# Patient Record
Sex: FEMALE | Race: WHITE | NOT HISPANIC OR LATINO | Employment: OTHER | URBAN - METROPOLITAN AREA
[De-identification: names, ages, dates, MRNs, and addresses within clinical notes are randomized per-mention and may not be internally consistent; named-entity substitution may affect disease eponyms.]

---

## 2023-03-20 DIAGNOSIS — F41.9 ANXIETY: ICD-10-CM

## 2023-03-20 RX ORDER — ESCITALOPRAM OXALATE 10 MG/1
1 TABLET ORAL DAILY
COMMUNITY
Start: 2021-12-06 | End: 2023-03-20 | Stop reason: SDUPTHER

## 2023-03-20 RX ORDER — ESCITALOPRAM OXALATE 10 MG/1
10 TABLET ORAL DAILY
Qty: 90 TABLET | Refills: 0 | Status: SHIPPED | OUTPATIENT
Start: 2023-03-20 | End: 2023-06-13 | Stop reason: SDUPTHER

## 2023-04-07 ENCOUNTER — TELEPHONE (OUTPATIENT)
Dept: PRIMARY CARE | Facility: CLINIC | Age: 83
End: 2023-04-07
Payer: MEDICARE

## 2023-04-21 ENCOUNTER — TELEPHONE (OUTPATIENT)
Dept: PRIMARY CARE | Facility: CLINIC | Age: 83
End: 2023-04-21
Payer: MEDICARE

## 2023-06-13 ENCOUNTER — OFFICE VISIT (OUTPATIENT)
Dept: PRIMARY CARE | Facility: CLINIC | Age: 83
End: 2023-06-13
Payer: MEDICARE

## 2023-06-13 VITALS
BODY MASS INDEX: 38.34 KG/M2 | SYSTOLIC BLOOD PRESSURE: 146 MMHG | OXYGEN SATURATION: 94 % | DIASTOLIC BLOOD PRESSURE: 76 MMHG | HEART RATE: 64 BPM | WEIGHT: 171 LBS

## 2023-06-13 DIAGNOSIS — Z00.00 MEDICARE ANNUAL WELLNESS VISIT, SUBSEQUENT: Primary | ICD-10-CM

## 2023-06-13 DIAGNOSIS — E78.2 MIXED HYPERLIPIDEMIA: ICD-10-CM

## 2023-06-13 DIAGNOSIS — Z12.31 ENCOUNTER FOR SCREENING MAMMOGRAM FOR BREAST CANCER: ICD-10-CM

## 2023-06-13 DIAGNOSIS — I10 BENIGN ESSENTIAL HYPERTENSION: ICD-10-CM

## 2023-06-13 DIAGNOSIS — Z13.89 ENCOUNTER FOR SCREENING FOR OTHER DISORDER: ICD-10-CM

## 2023-06-13 DIAGNOSIS — E11.9 TYPE 2 DIABETES MELLITUS WITHOUT COMPLICATION, WITHOUT LONG-TERM CURRENT USE OF INSULIN (MULTI): ICD-10-CM

## 2023-06-13 DIAGNOSIS — Z00.00 ROUTINE GENERAL MEDICAL EXAMINATION AT A HEALTH CARE FACILITY: ICD-10-CM

## 2023-06-13 DIAGNOSIS — F41.9 ANXIETY: ICD-10-CM

## 2023-06-13 DIAGNOSIS — E66.01 CLASS 2 SEVERE OBESITY DUE TO EXCESS CALORIES WITH SERIOUS COMORBIDITY AND BODY MASS INDEX (BMI) OF 38.0 TO 38.9 IN ADULT (MULTI): ICD-10-CM

## 2023-06-13 PROBLEM — R26.2 DIFFICULTY WALKING: Status: ACTIVE | Noted: 2023-06-13

## 2023-06-13 PROBLEM — G47.00 INSOMNIA: Status: ACTIVE | Noted: 2023-06-13

## 2023-06-13 PROBLEM — R26.89 BALANCE DISORDER: Status: ACTIVE | Noted: 2023-06-13

## 2023-06-13 PROBLEM — M85.80 OSTEOPENIA: Status: ACTIVE | Noted: 2023-06-13

## 2023-06-13 PROBLEM — M51.369 DEGENERATION OF INTERVERTEBRAL DISC OF LUMBAR REGION: Status: ACTIVE | Noted: 2023-06-13

## 2023-06-13 PROBLEM — M51.36 DEGENERATION OF INTERVERTEBRAL DISC OF LUMBAR REGION: Status: ACTIVE | Noted: 2023-06-13

## 2023-06-13 PROBLEM — M48.061 LUMBAR CANAL STENOSIS: Status: ACTIVE | Noted: 2023-06-13

## 2023-06-13 PROBLEM — Z97.3 WEARS EYEGLASSES: Status: ACTIVE | Noted: 2023-06-13

## 2023-06-13 PROBLEM — M13.0 ARTHRITIS OF MULTIPLE SITES: Status: ACTIVE | Noted: 2023-06-13

## 2023-06-13 PROBLEM — E55.9 VITAMIN D DEFICIENCY: Status: ACTIVE | Noted: 2023-06-13

## 2023-06-13 PROBLEM — J30.9 ALLERGIC RHINITIS: Status: ACTIVE | Noted: 2023-06-13

## 2023-06-13 PROBLEM — M54.9 BACKACHE: Status: ACTIVE | Noted: 2023-06-13

## 2023-06-13 PROBLEM — Z96.1 PSEUDOPHAKIA OF BOTH EYES: Status: ACTIVE | Noted: 2023-06-13

## 2023-06-13 PROBLEM — E04.2 NONTOXIC MULTINODULAR GOITER: Status: ACTIVE | Noted: 2023-06-13

## 2023-06-13 PROBLEM — M54.16 LUMBAR RADICULOPATHY: Status: ACTIVE | Noted: 2023-06-13

## 2023-06-13 PROBLEM — M43.10 ACQUIRED SPONDYLOLISTHESIS: Status: ACTIVE | Noted: 2023-06-13

## 2023-06-13 PROBLEM — K21.9 ESOPHAGEAL REFLUX: Status: ACTIVE | Noted: 2023-06-13

## 2023-06-13 PROBLEM — G25.0 BENIGN FAMILIAL TREMOR: Status: ACTIVE | Noted: 2023-06-13

## 2023-06-13 PROBLEM — Z96.1 PRESENCE OF ARTIFICIAL INTRA-OCULAR LENS: Status: ACTIVE | Noted: 2023-06-13

## 2023-06-13 PROBLEM — H04.123 DRY EYES: Status: ACTIVE | Noted: 2023-06-13

## 2023-06-13 PROBLEM — M79.671 RIGHT FOOT PAIN: Status: ACTIVE | Noted: 2023-06-13

## 2023-06-13 PROBLEM — I25.10 CAD (CORONARY ARTERY DISEASE): Status: ACTIVE | Noted: 2023-06-13

## 2023-06-13 PROBLEM — H43.399 VITREOUS FLOATERS: Status: ACTIVE | Noted: 2023-06-13

## 2023-06-13 PROBLEM — M79.18 MYOFASCIAL PAIN: Status: ACTIVE | Noted: 2023-06-13

## 2023-06-13 PROBLEM — E04.1 THYROID NODULE: Status: ACTIVE | Noted: 2023-06-13

## 2023-06-13 PROBLEM — H53.8 BLURRED VISION, BILATERAL: Status: ACTIVE | Noted: 2023-06-13

## 2023-06-13 PROBLEM — M47.816 SPONDYLOSIS OF LUMBAR SPINE: Status: ACTIVE | Noted: 2023-06-13

## 2023-06-13 PROBLEM — M25.562 LEFT KNEE PAIN: Status: ACTIVE | Noted: 2023-06-13

## 2023-06-13 PROBLEM — K11.8 PAROTID MASS: Status: ACTIVE | Noted: 2023-06-13

## 2023-06-13 PROBLEM — M25.512 LEFT SHOULDER PAIN: Status: ACTIVE | Noted: 2023-06-13

## 2023-06-13 PROBLEM — R05.3 PERSISTENT COUGH: Status: ACTIVE | Noted: 2023-06-13

## 2023-06-13 PROBLEM — M53.3 SACROILIAC JOINT PAIN: Status: ACTIVE | Noted: 2023-06-13

## 2023-06-13 PROBLEM — R33.9 INCOMPLETE EMPTYING OF BLADDER: Status: ACTIVE | Noted: 2023-06-13

## 2023-06-13 PROBLEM — I35.0 AORTIC STENOSIS: Status: ACTIVE | Noted: 2023-06-13

## 2023-06-13 PROBLEM — M53.3 SACROILIAC JOINT DYSFUNCTION OF BOTH SIDES: Status: ACTIVE | Noted: 2023-06-13

## 2023-06-13 PROBLEM — M54.32 SCIATICA OF LEFT SIDE: Status: ACTIVE | Noted: 2023-06-13

## 2023-06-13 PROBLEM — M25.552 LEFT HIP PAIN: Status: ACTIVE | Noted: 2023-06-13

## 2023-06-13 PROBLEM — R32 URINARY INCONTINENCE: Status: ACTIVE | Noted: 2023-06-13

## 2023-06-13 PROBLEM — M62.81 PROXIMAL MUSCLE WEAKNESS: Status: ACTIVE | Noted: 2023-06-13

## 2023-06-13 PROBLEM — H52.7 REFRACTIVE ERROR: Status: ACTIVE | Noted: 2023-06-13

## 2023-06-13 PROBLEM — Z98.890 HISTORY OF YAG LASER IRIDOTOMY: Status: ACTIVE | Noted: 2023-06-13

## 2023-06-13 PROBLEM — R79.89 ABNORMAL CBC: Status: ACTIVE | Noted: 2023-06-13

## 2023-06-13 PROBLEM — M19.012 OSTEOARTHRITIS OF LEFT AC (ACROMIOCLAVICULAR) JOINT: Status: ACTIVE | Noted: 2023-06-13

## 2023-06-13 PROBLEM — H35.379 EPIRETINAL MEMBRANE: Status: ACTIVE | Noted: 2023-06-13

## 2023-06-13 PROBLEM — R25.1 TREMOR: Status: ACTIVE | Noted: 2023-06-13

## 2023-06-13 PROBLEM — N32.81 OVERACTIVE BLADDER: Status: ACTIVE | Noted: 2023-06-13

## 2023-06-13 PROBLEM — H60.90 OTITIS EXTERNA: Status: ACTIVE | Noted: 2023-06-13

## 2023-06-13 PROBLEM — R92.8 ABNORMAL MAMMOGRAM: Status: ACTIVE | Noted: 2023-06-13

## 2023-06-13 PROCEDURE — 99397 PER PM REEVAL EST PAT 65+ YR: CPT | Performed by: PHYSICIAN ASSISTANT

## 2023-06-13 PROCEDURE — 1036F TOBACCO NON-USER: CPT | Performed by: PHYSICIAN ASSISTANT

## 2023-06-13 PROCEDURE — 1033F TOBACCO NONSMOKER NOR 2NDHND: CPT | Performed by: PHYSICIAN ASSISTANT

## 2023-06-13 PROCEDURE — G0439 PPPS, SUBSEQ VISIT: HCPCS | Performed by: PHYSICIAN ASSISTANT

## 2023-06-13 PROCEDURE — 1159F MED LIST DOCD IN RCRD: CPT | Performed by: PHYSICIAN ASSISTANT

## 2023-06-13 PROCEDURE — 3078F DIAST BP <80 MM HG: CPT | Performed by: PHYSICIAN ASSISTANT

## 2023-06-13 PROCEDURE — 3077F SYST BP >= 140 MM HG: CPT | Performed by: PHYSICIAN ASSISTANT

## 2023-06-13 PROCEDURE — 99213 OFFICE O/P EST LOW 20 MIN: CPT | Performed by: PHYSICIAN ASSISTANT

## 2023-06-13 PROCEDURE — 1160F RVW MEDS BY RX/DR IN RCRD: CPT | Performed by: PHYSICIAN ASSISTANT

## 2023-06-13 PROCEDURE — 1170F FXNL STATUS ASSESSED: CPT | Performed by: PHYSICIAN ASSISTANT

## 2023-06-13 PROCEDURE — 1124F ACP DISCUSS-NO DSCNMKR DOCD: CPT | Performed by: PHYSICIAN ASSISTANT

## 2023-06-13 RX ORDER — PRIMIDONE 50 MG/1
50 TABLET ORAL 3 TIMES DAILY
COMMUNITY
Start: 2016-02-11 | End: 2024-03-08 | Stop reason: SDUPTHER

## 2023-06-13 RX ORDER — BLOOD SUGAR DIAGNOSTIC
STRIP MISCELLANEOUS
COMMUNITY
Start: 2016-05-26

## 2023-06-13 RX ORDER — PROPRANOLOL HYDROCHLORIDE 80 MG/1
80 TABLET ORAL 3 TIMES DAILY
COMMUNITY
Start: 2013-01-30 | End: 2024-05-03 | Stop reason: SDUPTHER

## 2023-06-13 RX ORDER — FLUOCINONIDE TOPICAL SOLUTION USP, 0.05% 0.5 MG/ML
SOLUTION TOPICAL
COMMUNITY
Start: 2022-07-14 | End: 2023-12-05 | Stop reason: ALTCHOICE

## 2023-06-13 RX ORDER — SALIVA STIMULANT COMB. NO.4
1 SPRAY, NON-AEROSOL (ML) MUCOUS MEMBRANE DAILY
COMMUNITY
Start: 2022-12-06 | End: 2023-06-13 | Stop reason: ALTCHOICE

## 2023-06-13 RX ORDER — SPIRONOLACTONE AND HYDROCHLOROTHIAZIDE 25; 25 MG/1; MG/1
1 TABLET ORAL DAILY
COMMUNITY
Start: 2018-08-21

## 2023-06-13 RX ORDER — EZETIMIBE 10 MG/1
1 TABLET ORAL DAILY
COMMUNITY
Start: 2022-06-01

## 2023-06-13 RX ORDER — SPIRONOLACTONE 25 MG
1 TABLET ORAL 2 TIMES DAILY
COMMUNITY
Start: 2019-03-05

## 2023-06-13 RX ORDER — LOSARTAN POTASSIUM 100 MG/1
1 TABLET ORAL DAILY
COMMUNITY
Start: 2015-08-19 | End: 2023-10-27 | Stop reason: SDUPTHER

## 2023-06-13 RX ORDER — FLUTICASONE PROPIONATE 50 MCG
2 SPRAY, SUSPENSION (ML) NASAL DAILY PRN
COMMUNITY

## 2023-06-13 RX ORDER — ESCITALOPRAM OXALATE 10 MG/1
10 TABLET ORAL DAILY
Qty: 90 TABLET | Refills: 1 | Status: SHIPPED | OUTPATIENT
Start: 2023-06-13 | End: 2023-12-19

## 2023-06-13 RX ORDER — ROSUVASTATIN CALCIUM 40 MG/1
1 TABLET, COATED ORAL DAILY
COMMUNITY
Start: 2013-02-20

## 2023-06-13 RX ORDER — KETOCONAZOLE 20 MG/ML
SHAMPOO, SUSPENSION TOPICAL
COMMUNITY
Start: 2022-12-12 | End: 2023-12-05 | Stop reason: ALTCHOICE

## 2023-06-13 RX ORDER — CHOLECALCIFEROL (VITAMIN D3) 125 MCG
1 CAPSULE ORAL DAILY
COMMUNITY
Start: 2014-10-30

## 2023-06-13 RX ORDER — ASPIRIN 81 MG/1
1 TABLET ORAL DAILY
COMMUNITY
End: 2023-06-13 | Stop reason: ALTCHOICE

## 2023-06-13 RX ORDER — HYDROCORTISONE 25 MG/G
CREAM TOPICAL
COMMUNITY
Start: 2023-02-04

## 2023-06-13 RX ORDER — OXYBUTYNIN CHLORIDE 5 MG/1
1 TABLET, EXTENDED RELEASE ORAL DAILY
COMMUNITY
Start: 2022-06-06 | End: 2023-12-05 | Stop reason: ALTCHOICE

## 2023-06-13 RX ORDER — ALENDRONATE SODIUM 70 MG/1
70 TABLET ORAL
COMMUNITY
End: 2023-07-25

## 2023-06-13 RX ORDER — VITAMIN B COMPLEX
TABLET ORAL
COMMUNITY
Start: 2017-05-09

## 2023-06-13 RX ORDER — AMLODIPINE BESYLATE 5 MG/1
1 TABLET ORAL DAILY
COMMUNITY
Start: 2019-07-02 | End: 2024-05-03 | Stop reason: SDUPTHER

## 2023-06-13 RX ORDER — MULTIVIT-MIN/FA/LYCOPEN/LUTEIN .4-300-25
1 TABLET ORAL DAILY
COMMUNITY

## 2023-06-13 ASSESSMENT — ACTIVITIES OF DAILY LIVING (ADL)
GROCERY_SHOPPING: INDEPENDENT
DOING_HOUSEWORK: INDEPENDENT
TAKING_MEDICATION: INDEPENDENT
DRESSING: INDEPENDENT
BATHING: INDEPENDENT
MANAGING_FINANCES: INDEPENDENT

## 2023-06-13 ASSESSMENT — ENCOUNTER SYMPTOMS
DEPRESSION: 0
LOSS OF SENSATION IN FEET: 0
OCCASIONAL FEELINGS OF UNSTEADINESS: 1

## 2023-06-13 ASSESSMENT — PATIENT HEALTH QUESTIONNAIRE - PHQ9
SUM OF ALL RESPONSES TO PHQ9 QUESTIONS 1 AND 2: 0
2. FEELING DOWN, DEPRESSED OR HOPELESS: NOT AT ALL
1. LITTLE INTEREST OR PLEASURE IN DOING THINGS: NOT AT ALL

## 2023-06-13 NOTE — ASSESSMENT & PLAN NOTE
Well controlled through diet and exercise. Hemoglobin A1c and urine albumin ordered.  Encouraged to schedule for diabetic foot and eye exams if not up-to-date.

## 2023-06-13 NOTE — PROGRESS NOTES
Subjective   Reason for Visit: Monica Hernandez is an 82 y.o. female here for a Medicare Wellness visit.     Past Medical, Surgical, and Family History reviewed and updated in chart.         HPI 82-year-old female presenting for Medicare wellness visit.  Doing fairly well overall. No complaints.    HTN, CAD: Compliant with current regimen.  She does not check BP at home but she does have a monitor.  She notes she had dental work done this morning and attributes her blood pressure elevation to it. Follows with cardiology, last seen 6/6/2023.  Denies any headache, dizziness, chest pain, SOB/CALLAHAN, palpitations, LE edema.    T2DM: managed through diet and exercise. Checks BG levels at home, usually low 100s fasting. Urine albumin due, ordered. Diabetic eye exam: due, last 2021. Diabetic foot exam: none, checks feet often for cuts and sores. No neuropathy symptoms.     Anxiety: Stable on escitalopram 10 mg.  No thoughts of self or other harm    Health maintenance:  Immunizations:  -Flu UTD  -COVID received 5 doses  -Pneumococcal: UTD  -Shingrix: UTD  -Tdap: UTD, last 2019  Mammogram UTD (last 8/5/2022)  Colon cancer screening -not indicated due to age  DEXA UTD (last 7/22/2022)  No healthcare POA/ living will     Last MCR / CPE: 6/13/23 ([facility])     Patient Care Team:  Brea Hodge PA-C as PCP - General     Objective   Vitals:  /76   Pulse 64   Wt 77.6 kg (171 lb)   SpO2 94%   BMI 38.34 kg/m²       Physical Exam  Vitals reviewed.   Constitutional:       General: She is not in acute distress.     Appearance: Normal appearance.   HENT:      Head: Normocephalic and atraumatic.      Right Ear: Tympanic membrane, ear canal and external ear normal. There is no impacted cerumen.      Left Ear: Tympanic membrane, ear canal and external ear normal. There is no impacted cerumen.      Nose: Nose normal. No congestion or rhinorrhea.      Mouth/Throat:      Mouth: Mucous membranes are moist.      Pharynx: Oropharynx is  clear. No oropharyngeal exudate or posterior oropharyngeal erythema.   Eyes:      General: No scleral icterus.        Right eye: No discharge.         Left eye: No discharge.      Extraocular Movements: Extraocular movements intact.      Conjunctiva/sclera: Conjunctivae normal.      Pupils: Pupils are equal, round, and reactive to light.   Cardiovascular:      Rate and Rhythm: Normal rate and regular rhythm.      Heart sounds: Normal heart sounds. No murmur heard.     No friction rub. No gallop.   Pulmonary:      Effort: Pulmonary effort is normal. No respiratory distress.      Breath sounds: Normal breath sounds. No stridor. No wheezing, rhonchi or rales.   Abdominal:      General: Bowel sounds are normal. There is no distension.      Palpations: Abdomen is soft. There is no mass.      Tenderness: There is no abdominal tenderness. There is no right CVA tenderness or left CVA tenderness.   Musculoskeletal:         General: Normal range of motion.      Cervical back: Normal range of motion and neck supple.      Right lower leg: No edema.      Left lower leg: No edema.      Comments: Ambulates with cane.    Skin:     General: Skin is warm and dry.      Findings: No rash.   Neurological:      General: No focal deficit present.      Mental Status: She is alert and oriented to person, place, and time. Mental status is at baseline.      Cranial Nerves: No cranial nerve deficit.      Gait: Gait normal.   Psychiatric:         Mood and Affect: Mood normal.         Behavior: Behavior normal.         Assessment/Plan   Problem List Items Addressed This Visit       Anxiety    Current Assessment & Plan     Stable.  Continue escitalopram 10 mg daily.         Relevant Medications    escitalopram (Lexapro) 10 mg tablet    Benign essential hypertension    Current Assessment & Plan     Acceptable for age.  Continue current regimen unchanged.  Follow strict DASH diet and exercise as tolerated.  Follow with cardiology.         Mixed  hyperlipidemia    Current Assessment & Plan     Continue rosuvastatin 40 mg and Zetia 10 mg.  Follow Mediterranean-style diet and exercise as tolerated.  Follow with cardiology         Relevant Orders    Lipid panel    Class 2 severe obesity due to excess calories with serious comorbidity and body mass index (BMI) of 38.0 to 38.9 in adult (CMS/[facility])    Current Assessment & Plan     Follow a low-carb, high-protein diet.  Exercise for 30 minutes daily as tolerated.  Can refer to nutritionist if interested         Routine general medical examination at a health care facility    Current Assessment & Plan     Discussed age-appropriate preventative health measures.  CPE labs ordered.         Relevant Orders    Lipid panel    Comprehensive metabolic panel    Hemoglobin A1c    Diabetes mellitus type 2, uncomplicated (CMS/[facility])    Current Assessment & Plan     Well controlled through diet and exercise. Hemoglobin A1c and urine albumin ordered.  Encouraged to schedule for diabetic foot and eye exams if not up-to-date.         Relevant Orders    Comprehensive metabolic panel    Hemoglobin A1c    Albumin, urine, random     Other Visit Diagnoses       Medicare annual wellness visit, subsequent    -  Primary    Encounter for screening for other disorder        Encounter for screening mammogram for breast cancer        Relevant Orders    BI mammo bilateral screening tomosynthesis             Follow up in 6 months or sooner as needed

## 2023-06-15 PROBLEM — E66.812 CLASS 2 SEVERE OBESITY DUE TO EXCESS CALORIES WITH SERIOUS COMORBIDITY AND BODY MASS INDEX (BMI) OF 38.0 TO 38.9 IN ADULT: Status: ACTIVE | Noted: 2023-06-13

## 2023-06-15 PROBLEM — Z00.00 ROUTINE GENERAL MEDICAL EXAMINATION AT A HEALTH CARE FACILITY: Status: ACTIVE | Noted: 2023-06-15

## 2023-06-16 NOTE — ASSESSMENT & PLAN NOTE
Acceptable for age.  Continue current regimen unchanged.  Follow strict DASH diet and exercise as tolerated.  Follow with cardiology.

## 2023-06-16 NOTE — ASSESSMENT & PLAN NOTE
Continue rosuvastatin 40 mg and Zetia 10 mg.  Follow Mediterranean-style diet and exercise as tolerated.  Follow with cardiology

## 2023-06-16 NOTE — ASSESSMENT & PLAN NOTE
Follow a low-carb, high-protein diet.  Exercise for 30 minutes daily as tolerated.  Can refer to nutritionist if interested

## 2023-07-24 DIAGNOSIS — M85.80 OSTEOPENIA, UNSPECIFIED LOCATION: Primary | ICD-10-CM

## 2023-07-25 RX ORDER — ALENDRONATE SODIUM 70 MG/1
70 TABLET ORAL
Qty: 12 TABLET | Refills: 0 | Status: SHIPPED | OUTPATIENT
Start: 2023-07-25 | End: 2023-10-18

## 2023-08-14 ENCOUNTER — HOSPITAL ENCOUNTER (OUTPATIENT)
Dept: DATA CONVERSION | Facility: HOSPITAL | Age: 83
Discharge: HOME | End: 2023-08-14

## 2023-08-14 DIAGNOSIS — R92.8 OTHER ABNORMAL AND INCONCLUSIVE FINDINGS ON DIAGNOSTIC IMAGING OF BREAST: ICD-10-CM

## 2023-10-17 DIAGNOSIS — M85.80 OSTEOPENIA, UNSPECIFIED LOCATION: ICD-10-CM

## 2023-10-18 RX ORDER — ALENDRONATE SODIUM 70 MG/1
70 TABLET ORAL
Qty: 12 TABLET | Refills: 0 | Status: SHIPPED | OUTPATIENT
Start: 2023-10-18 | End: 2023-12-31

## 2023-10-27 DIAGNOSIS — I10 BENIGN ESSENTIAL HYPERTENSION: Primary | ICD-10-CM

## 2023-10-27 RX ORDER — LOSARTAN POTASSIUM 100 MG/1
100 TABLET ORAL DAILY
Qty: 90 TABLET | Refills: 3 | Status: SHIPPED | OUTPATIENT
Start: 2023-10-27 | End: 2024-03-21 | Stop reason: SDUPTHER

## 2023-12-05 ENCOUNTER — OFFICE VISIT (OUTPATIENT)
Dept: CARDIOLOGY | Facility: CLINIC | Age: 83
End: 2023-12-05
Payer: MEDICARE

## 2023-12-05 ENCOUNTER — TELEPHONE (OUTPATIENT)
Dept: OTOLARYNGOLOGY | Facility: HOSPITAL | Age: 83
End: 2023-12-05
Payer: MEDICARE

## 2023-12-05 VITALS — HEIGHT: 56 IN | BODY MASS INDEX: 38.36 KG/M2 | WEIGHT: 170.5 LBS

## 2023-12-05 DIAGNOSIS — I25.118 CORONARY ARTERY DISEASE OF NATIVE ARTERY OF NATIVE HEART WITH STABLE ANGINA PECTORIS (CMS-HCC): Primary | ICD-10-CM

## 2023-12-05 DIAGNOSIS — E04.1 THYROID NODULE: Primary | ICD-10-CM

## 2023-12-05 PROCEDURE — 1159F MED LIST DOCD IN RCRD: CPT | Performed by: INTERNAL MEDICINE

## 2023-12-05 PROCEDURE — 99214 OFFICE O/P EST MOD 30 MIN: CPT | Performed by: INTERNAL MEDICINE

## 2023-12-05 PROCEDURE — 1126F AMNT PAIN NOTED NONE PRSNT: CPT | Performed by: INTERNAL MEDICINE

## 2023-12-05 PROCEDURE — 1036F TOBACCO NON-USER: CPT | Performed by: INTERNAL MEDICINE

## 2023-12-05 ASSESSMENT — PATIENT HEALTH QUESTIONNAIRE - PHQ9
SUM OF ALL RESPONSES TO PHQ9 QUESTIONS 1 AND 2: 0
1. LITTLE INTEREST OR PLEASURE IN DOING THINGS: NOT AT ALL
2. FEELING DOWN, DEPRESSED OR HOPELESS: NOT AT ALL

## 2023-12-05 ASSESSMENT — COLUMBIA-SUICIDE SEVERITY RATING SCALE - C-SSRS
1. IN THE PAST MONTH, HAVE YOU WISHED YOU WERE DEAD OR WISHED YOU COULD GO TO SLEEP AND NOT WAKE UP?: NO
2. HAVE YOU ACTUALLY HAD ANY THOUGHTS OF KILLING YOURSELF?: NO
6. HAVE YOU EVER DONE ANYTHING, STARTED TO DO ANYTHING, OR PREPARED TO DO ANYTHING TO END YOUR LIFE?: NO

## 2023-12-05 ASSESSMENT — PAIN SCALES - GENERAL: PAINLEVEL: 0-NO PAIN

## 2023-12-05 ASSESSMENT — ENCOUNTER SYMPTOMS
DEPRESSION: 0
OCCASIONAL FEELINGS OF UNSTEADINESS: 0
LOSS OF SENSATION IN FEET: 0

## 2023-12-05 NOTE — PROGRESS NOTES
Subjective   Lisa Michel is a 83 y.o. female.    Chief Complaint:  LISA MICHEL is being seen for a six month follow-up of coronary artery disease, dyslipidemia, hypertension and a routine medication evaluation.     HPI  This 82 y/o female is here today for a six month Cardiology follow up visit. She denies chest pain, sob, palpitations or pedal edema. She has back pain and tremors.     ROS    Objective   Physical Exam  This is an 82 y/o female here today for a six month Cardiology follow up visit  Cardiovascular Rate and Rhythm regular with S1 and S2 with 2/6 systolic murmur   Pulmonary lungs bilaterally clear to auscultation posterior  Neck supple no JVP no bruit good carotid upstroke  Abdomen soft nontender bowel sounds present in all four quadrants  Extremities trace pedal edema pedal pulse present bilaterally    Lab Review:   No visits with results within 6 Month(s) from this visit.   Latest known visit with results is:   Legacy Encounter on 12/15/2022   Component Date Value    Hemoglobin A1C 12/15/2022 5.8 (A)     Estimated Average Glucose 12/15/2022 120     Glucose 12/15/2022 116 (H)     Sodium 12/15/2022 142     Potassium 12/15/2022 4.3     Chloride 12/15/2022 99     Bicarbonate 12/15/2022 32     Anion Gap 12/15/2022 15     Urea Nitrogen 12/15/2022 14     Creatinine 12/15/2022 0.67     GFR Female 12/15/2022 88     Calcium 12/15/2022 10.3     Albumin 12/15/2022 4.5     Alkaline Phosphatase 12/15/2022 72     Total Protein 12/15/2022 7.1     AST 12/15/2022 20     Total Bilirubin 12/15/2022 0.4     ALT (SGPT) 12/15/2022 25     WBC 12/15/2022 5.9     nRBC 12/15/2022 0.0     RBC 12/15/2022 5.75 (H)     Hemoglobin 12/15/2022 12.5     Hematocrit 12/15/2022 42.1     MCV 12/15/2022 73 (L)     MCHC 12/15/2022 29.7 (L)     Platelets 12/15/2022 307     RDW 12/15/2022 15.4 (H)     Neutrophils % 12/15/2022 59.3     Immature Granulocytes %,* 12/15/2022 0.3     Lymphocytes % 12/15/2022 28.5     Monocytes %  12/15/2022 9.4     Eosinophils % 12/15/2022 1.7     Basophils % 12/15/2022 0.8     Neutrophils Absolute 12/15/2022 3.51     Lymphocytes Absolute 12/15/2022 1.69     Monocytes Absolute 12/15/2022 0.56     Eosinophils Absolute 12/15/2022 0.10     Basophils Absolute 12/15/2022 0.05     TSH 12/15/2022 3.28     Cholesterol 12/15/2022 170     HDL 12/15/2022 64.4     Cholesterol/HDL Ratio 12/15/2022 2.6     LDL 12/15/2022 74     VLDL 12/15/2022 32     Triglycerides 12/15/2022 160 (H)     Vitamin D, 25-Hydroxy 12/15/2022 44        Assessment/Plan   There were no encounter diagnoses.  1. CAD. This patient is a very pleasant white female whose past history includes hypertension hyperlipidemia and glucose intolerance. She was a very remote smoker. She does have a family history of CAD including a older sister who  of MI at the age of 78 who is a diabetic beginning at the age of 17. She does have a brother however aged 83 who has 3 stents. She also has a sister age 78 rheumatoid arthritis who also has CHF and a minor CVA. The patient herself has not had any ongoing concerning chest discomfort. The patient did have a CT coronary calcium score performed on 3/24/2011 with a value of 225. Based on her risk factors and CT coronary calcium score the patient subsequently had an echocardiogram performed on 2015 which showed normal LV chamber size with mild concentric LV hypertrophy and vigorous left ventricular systolic function with an estimated ejection fraction of greater than 65%. There was mild left atrial enlargement, mild aortic valve thickening, mild aortic valve insufficiency. The patient also had a pharmacological nuclear stress test on 2015 which showed normal myocardial perfusion with no defects and an LV ejection fraction of 55%. The patient is presently asymptomatic and will be monitored clinically. Prelim read of echo done today showed EF 65%, mild LVH, 1+ AI with 31 mm Hg gradient and mild MR, mild  aortic sclerosis, mild LAE. Patient had pharmacologic nuclear stress testing done 2021 that was negative for ischemia.  The patient is presently feeling fairly well using a walker to ambulate  but less so in the house.  No lightheadedness dizziness palpitations or shortness of breath.  2. Glucose intolerance. The patient did ave lab work performed on 2021 which included a hemoglobin A1c of 6.8%. The patient presently is not on any oral hypoglycemic therapy.  3. Hyperlipidemia. The patient just started Crestor 40 mg daily with lipid panel performed on 2021 including cholesterol 181 LDL 94 HDL 55 triglyceride 158. Start Zetia.  Most recent lipid panel on rosuvastatin 40 mg daily.  Zetia 10 mg daily.  Will recheck a fasting lipid panel CMP included cholesterol 170 LDL 74 HDL 64 triglyceride 160 glycohemoglobin and UACR before and next visit.  4. Hypertension. Blood pressure is well controlled today. For now she will remain on amlodipine 5 mg daily rather than the 10 mg dose due to edema. She also will remain on losartan 100 mg daily, Aldactazide 25/25 mg daily and propanolol 80 mg 3 times a day.  Lab work from 1512/15/2020 3/2022 included a glycohemoglobin of 5.8 normal CBC CMP panel  vitamin D 44.  5. Anxiety  6. Positive family history of CAD  7. Remote smoking  8. Status post  section x5  9. Remote excision of ganglion cyst from the left wrist.  10. Tremor. The patient does have a very noticeable tremor despite the propranolol 80 mg 3 times a day and primidone 50 three times per day.  11. Minimal carotid vascular disease. The patient did have a carotid ultrasound on 2011 which showed minimal intimal thickening.   12. Obesity  13. Hx of thyroid nodule.   14. History of COVID-19 vaccine #1 and #2.

## 2023-12-06 ENCOUNTER — TELEPHONE (OUTPATIENT)
Dept: CARDIOLOGY | Facility: CLINIC | Age: 83
End: 2023-12-06
Payer: MEDICARE

## 2023-12-07 ENCOUNTER — LAB (OUTPATIENT)
Dept: LAB | Facility: LAB | Age: 83
End: 2023-12-07
Payer: MEDICARE

## 2023-12-07 DIAGNOSIS — Z00.00 ROUTINE GENERAL MEDICAL EXAMINATION AT A HEALTH CARE FACILITY: ICD-10-CM

## 2023-12-07 DIAGNOSIS — E11.9 TYPE 2 DIABETES MELLITUS WITHOUT COMPLICATION, WITHOUT LONG-TERM CURRENT USE OF INSULIN (MULTI): ICD-10-CM

## 2023-12-07 DIAGNOSIS — E78.2 MIXED HYPERLIPIDEMIA: ICD-10-CM

## 2023-12-07 LAB
ALBUMIN SERPL BCP-MCNC: 4.4 G/DL (ref 3.4–5)
ALP SERPL-CCNC: 77 U/L (ref 33–136)
ALT SERPL W P-5'-P-CCNC: 24 U/L (ref 7–45)
ANION GAP SERPL CALC-SCNC: 11 MMOL/L (ref 10–20)
AST SERPL W P-5'-P-CCNC: 20 U/L (ref 9–39)
BILIRUB SERPL-MCNC: 0.4 MG/DL (ref 0–1.2)
BUN SERPL-MCNC: 15 MG/DL (ref 6–23)
CALCIUM SERPL-MCNC: 10.5 MG/DL (ref 8.6–10.6)
CHLORIDE SERPL-SCNC: 101 MMOL/L (ref 98–107)
CHOLEST SERPL-MCNC: 159 MG/DL (ref 0–199)
CHOLESTEROL/HDL RATIO: 2.4
CO2 SERPL-SCNC: 34 MMOL/L (ref 21–32)
CREAT SERPL-MCNC: 0.76 MG/DL (ref 0.5–1.05)
CREAT UR-MCNC: 110.8 MG/DL (ref 20–320)
EST. AVERAGE GLUCOSE BLD GHB EST-MCNC: 111 MG/DL
GFR SERPL CREATININE-BSD FRML MDRD: 78 ML/MIN/1.73M*2
GLUCOSE SERPL-MCNC: 101 MG/DL (ref 74–99)
HBA1C MFR BLD: 5.5 %
HDLC SERPL-MCNC: 66.5 MG/DL
LDLC SERPL CALC-MCNC: 67 MG/DL
MICROALBUMIN UR-MCNC: <7 MG/L
MICROALBUMIN/CREAT UR: NORMAL MG/G{CREAT}
NON HDL CHOLESTEROL: 93 MG/DL (ref 0–149)
POTASSIUM SERPL-SCNC: 4 MMOL/L (ref 3.5–5.3)
PROT SERPL-MCNC: 7.4 G/DL (ref 6.4–8.2)
SODIUM SERPL-SCNC: 142 MMOL/L (ref 136–145)
TRIGL SERPL-MCNC: 126 MG/DL (ref 0–149)
VLDL: 25 MG/DL (ref 0–40)

## 2023-12-07 PROCEDURE — 83036 HEMOGLOBIN GLYCOSYLATED A1C: CPT

## 2023-12-07 PROCEDURE — 82043 UR ALBUMIN QUANTITATIVE: CPT

## 2023-12-07 PROCEDURE — 36415 COLL VENOUS BLD VENIPUNCTURE: CPT

## 2023-12-07 PROCEDURE — 80053 COMPREHEN METABOLIC PANEL: CPT

## 2023-12-07 PROCEDURE — 80061 LIPID PANEL: CPT

## 2023-12-07 PROCEDURE — 82570 ASSAY OF URINE CREATININE: CPT

## 2023-12-14 ENCOUNTER — APPOINTMENT (OUTPATIENT)
Dept: PRIMARY CARE | Facility: CLINIC | Age: 83
End: 2023-12-14
Payer: MEDICARE

## 2023-12-19 DIAGNOSIS — F41.9 ANXIETY: ICD-10-CM

## 2023-12-19 RX ORDER — ESCITALOPRAM OXALATE 10 MG/1
10 TABLET ORAL DAILY
Qty: 90 TABLET | Refills: 0 | Status: SHIPPED | OUTPATIENT
Start: 2023-12-19 | End: 2024-02-03 | Stop reason: SDUPTHER

## 2023-12-20 NOTE — RESULT ENCOUNTER NOTE
All labs look stable.Hemoglobin A1c was 5.5%, showing great control of diabetes. No new interventions at this time. Continue a heart healthy lifestyle

## 2023-12-31 DIAGNOSIS — M85.80 OSTEOPENIA, UNSPECIFIED LOCATION: ICD-10-CM

## 2023-12-31 RX ORDER — ALENDRONATE SODIUM 70 MG/1
70 TABLET ORAL
Qty: 12 TABLET | Refills: 0 | Status: SHIPPED | OUTPATIENT
Start: 2023-12-31 | End: 2024-02-03 | Stop reason: SDUPTHER

## 2024-01-23 ENCOUNTER — APPOINTMENT (OUTPATIENT)
Dept: PRIMARY CARE | Facility: CLINIC | Age: 84
End: 2024-01-23
Payer: MEDICARE

## 2024-01-25 ENCOUNTER — OFFICE VISIT (OUTPATIENT)
Dept: NEUROLOGY | Facility: CLINIC | Age: 84
End: 2024-01-25
Payer: MEDICARE

## 2024-01-25 VITALS
HEART RATE: 60 BPM | DIASTOLIC BLOOD PRESSURE: 74 MMHG | WEIGHT: 170 LBS | SYSTOLIC BLOOD PRESSURE: 136 MMHG | HEIGHT: 56 IN | BODY MASS INDEX: 38.24 KG/M2

## 2024-01-25 DIAGNOSIS — R26.89 IMBALANCE: ICD-10-CM

## 2024-01-25 DIAGNOSIS — M48.061 SPINAL STENOSIS OF LUMBAR REGION, UNSPECIFIED WHETHER NEUROGENIC CLAUDICATION PRESENT: ICD-10-CM

## 2024-01-25 DIAGNOSIS — G25.0 ESSENTIAL TREMOR: Primary | ICD-10-CM

## 2024-01-25 DIAGNOSIS — R41.3 COMPLAINTS OF MEMORY DISTURBANCE: ICD-10-CM

## 2024-01-25 PROCEDURE — 3075F SYST BP GE 130 - 139MM HG: CPT | Performed by: PSYCHIATRY & NEUROLOGY

## 2024-01-25 PROCEDURE — 99214 OFFICE O/P EST MOD 30 MIN: CPT | Performed by: PSYCHIATRY & NEUROLOGY

## 2024-01-25 PROCEDURE — 1126F AMNT PAIN NOTED NONE PRSNT: CPT | Performed by: PSYCHIATRY & NEUROLOGY

## 2024-01-25 PROCEDURE — 1036F TOBACCO NON-USER: CPT | Performed by: PSYCHIATRY & NEUROLOGY

## 2024-01-25 PROCEDURE — 1157F ADVNC CARE PLAN IN RCRD: CPT | Performed by: PSYCHIATRY & NEUROLOGY

## 2024-01-25 PROCEDURE — 3078F DIAST BP <80 MM HG: CPT | Performed by: PSYCHIATRY & NEUROLOGY

## 2024-01-25 NOTE — PATIENT INSTRUCTIONS
We discussed that your tremor has only partially responded to the medications, but you do not wish to make increases in medications at this time.    Your walking seems to be stabilized nicely by the rollator and I recommend you continue using it to reduce risk of falls.    We discussed your memory concerns.  You do well on testing in the office today and I do not suspect dementia.  I do recommend checking blood tests that may be pertinent (also related to primidone therapy).  The office will call with lab results.  You should continue on your B12 supplement.    Please see me in the office in 1 year.

## 2024-01-25 NOTE — PROGRESS NOTES
Subjective     Monica Hernandez is a 83 y.o. year old female seen in follow-up for essential tremor, lumbar stenosis, imbalance.  Additionally voices memory concerns.    HPI    83-year-old right-handed  woman with past medical history significant for hypertension, type 2 diabetes, hyperlipidemia, severe lumbar stenosis associated with chronic low back pain, cataract surgery, former smoking.     I saw her initially on 9/17/2020 referred for question of a parkinsonian condition. In fact she exhibited head and limb tremor compatible with essential tremor, and a nonspecific gait disturbance. At the time I saw her she was already on a chronic stable regimen of primidone 100 mg 3 times daily and propranolol 80 mg 3 times daily as well as gabapentin 600 mg 3 times daily. Her head and left more than right upper extremity tremor was persistent despite this regimen. Her gait displayed flexion at the waist suggesting lumbar stenosis and she moved stably with a walker.     Labs were unrevealing including serum CK, primidone/phenobarbital panel, and methylmalonic acid level. I obtained a head CT which was essentially unremarkable for age. I recommended a movement disorder subspecialty consultation given that she was already on 3 agents for essential tremor.     She saw Dr. Austin in December 2020 for movement disorders consultation who recommended primidone titration, which she was starting when I evaluated her by audiovisual visit on 3/12/2021.     I evaluated her again on 6/21/2021 by telephone visit as she did not have video capability at that time. She was doing reasonably well and I did not make any change in her regimen.     I evaluated her again on 1/4/2022, in the office. She noted adequate tremor control on a stable primidone regimen of 125 mg morning and midday and 150 mg at night and propranolol regimen of 80 mg 3 times daily. She noted subjective stability of leg strength and was using a Rollator for  "balance.    I evaluated her most recently on 1/3/2023 in the office. She appeared neurologically stable at that time.     She is evaluated again today in the office, accompanied by her .     From the standpoint of essential tremor she continues to note a mild to moderate degree of tremor much of the time but does not want to increase her medication.  She is currently taking primidone as 2.5 of the 50 mg tablets in the morning and midday and 3 tablets at night.  Her dosage times are variable.  She is taking propranolol as 80 mg 3 times daily.  She feels she is tolerating her medications well.  She notes adequate control of the tremor for purposes of use of utensils or drinking from a cup or glass.    Recent CMP shows normal liver function studies in early December 2023.  She has not had a recent CBC.    From the standpoint of bar stenosis and imbalance she is using her rollator consistently and indicates no interval falls.  She reports good walking endurance and standing endurance without obvious symptoms of neurogenic claudication.    She denies lightheadedness, vertigo or headache.    She is on B12 replacement.    Her  brings up that she is forgetful and she asks about a \"test for dementia\".  He specifically indicates that she will forget conversations they had the previous day.  She suspects that these difficulties are just age-related.       Review of Systems    As per the history of present illness    Patient Active Problem List   Diagnosis    Abnormal CBC    Acquired spondylolisthesis    Abnormal mammogram    Allergic rhinitis    Anxiety    Aortic stenosis    Arthritis of multiple sites    Balance disorder    Benign essential hypertension    Benign familial tremor    Blurred vision, bilateral    CAD (coronary artery disease)    Degeneration of intervertebral disc of lumbar region    Diabetes mellitus type 2, uncomplicated (CMS/HCC)    Difficulty walking    Dry eyes    Epiretinal membrane    " Esophageal reflux    History of YAG laser iridotomy    Incomplete emptying of bladder    Insomnia    Backache    Left hip pain    Left knee pain    Left shoulder pain    Lumbar canal stenosis    Lumbar radiculopathy    Mixed hyperlipidemia    Class 2 severe obesity due to excess calories with serious comorbidity and body mass index (BMI) of 38.0 to 38.9 in adult (CMS/HCC)    Myofascial pain    Nontoxic multinodular goiter    Osteoarthritis of left AC (acromioclavicular) joint    Osteopenia    Otitis externa    Overactive bladder    Parotid mass    Persistent cough    Presence of artificial intra-ocular lens    Proximal muscle weakness    Pseudophakia of both eyes    Refractive error    Right foot pain    Sacroiliac joint dysfunction of both sides    Sacroiliac joint pain    Sciatica of left side    Spondylosis of lumbar spine    Thyroid nodule    Tremor    Urinary incontinence    Vitamin D deficiency    Vitreous floaters    Wears eyeglasses    Routine general medical examination at a health care facility     Past Medical History:   Diagnosis Date    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     Coronary artery disease involving transplanted heart, angina presence unspecified, unspecified vessel or lesion type    Encounter for screening mammogram for malignant neoplasm of breast 02/13/2014    Visit for screening mammogram    Other conditions influencing health status     DEXA Body Composition Study    Other long term (current) drug therapy 02/26/2019    Chronic prescription benzodiazepine use    Personal history of diseases of the skin and subcutaneous tissue     History of psoriasis    Personal history of other diseases of the circulatory system     History of hypertension    Personal history of other diseases of the nervous system and sense organs     History of cataract    Unspecified secondary cataract     Secondary cataract     Past Surgical History:   Procedure  Laterality Date     SECTION, CLASSIC  2013     Section    COLONOSCOPY  2013    Complete Colonoscopy    OTHER SURGICAL HISTORY  2019    Ganglion cyst excision    OTHER SURGICAL HISTORY  2019    Cataract surgery    OTHER SURGICAL HISTORY  2019    YAG laser capsulotomy    OTHER SURGICAL HISTORY  2016    Dilation Of Female Urethra    US GUIDED FINE PERCUTANEOUS ASPIRATION  2020    US GUIDED FINE PERCUTANEOUS ASPIRATION [facility]     Social History     Tobacco Use    Smoking status: Never    Smokeless tobacco: Never   Substance Use Topics    Alcohol use: Never     family history is not on file.    Current Outpatient Medications:     alendronate (Fosamax) 70 mg tablet, TAKE ONE TABLET BY MOUTH ONCE A WEEK, Disp: 12 tablet, Rfl: 0    amLODIPine (Norvasc) 5 mg tablet, Take 1 tablet (5 mg) by mouth once daily., Disp: , Rfl:     calcium carbonate-vitamin D3 (Calcium 600 with Vitamin D3) 600 mg-10 mcg (400 unit) chewable tablet, Chew 1 tablet 2 times a day., Disp: , Rfl:     cholecalciferol (Vitamin D-3) 125 MCG (5000 UT) capsule, Take 1 capsule (125 mcg) by mouth once daily., Disp: , Rfl:     cyanocobalamin, vitamin B-12, 2,500 mcg tablet, sublingual, Place under the tongue once daily., Disp: , Rfl:     escitalopram (Lexapro) 10 mg tablet, TAKE 1 TABLET DAILY, Disp: 90 tablet, Rfl: 0    ezetimibe (Zetia) 10 mg tablet, Take 1 tablet (10 mg) by mouth once daily., Disp: , Rfl:     fluticasone (Flonase) 50 mcg/actuation nasal spray, Administer 2 sprays into each nostril once daily as needed., Disp: , Rfl:     hydrocortisone 2.5 % cream, APPLY TWICE A DAY TO AFFECTED AREAS AS NEEDED, Disp: , Rfl:     losartan (Cozaar) 100 mg tablet, Take 1 tablet (100 mg) by mouth once daily., Disp: 90 tablet, Rfl: 3    multivit-iron-minerals-folic acid (Centrum Silver) 0.4 mg-300 mcg- 250 mcg tab, Take 1 tablet by mouth once daily., Disp: , Rfl:     OneTouch Ultra Test strip, once  daily., Disp: , Rfl:     primidone (Mysoline) 50 mg tablet, Take 1 tablet (50 mg) by mouth 3 times a day., Disp: , Rfl:     propranolol (Inderal) 80 mg tablet, Take 1 tablet (80 mg) by mouth 3 times a day., Disp: , Rfl:     rosuvastatin (Crestor) 40 mg tablet, Take 1 tablet (40 mg) by mouth once daily., Disp: , Rfl:     spironolacton-hydrochlorothiaz (Aldactazide) 25-25 mg tablet, Take 1 tablet (25 mg) by mouth once daily., Disp: , Rfl:   Allergies   Allergen Reactions    Tetracyclines Unknown    Sulfamethoxazole-Trimethoprim Rash       Objective   Neurological Exam  Physical Exam    Physical Examination:    General: Alert woman who was ambulatory with a rollator.    Mental Status: Clear sensorium without fluctuation.  Appropriate and insightful in conversation.  Fluent unremarkable speech without paraphasic errors or hesitancy.  Oriented to self, date and day of the week, suburb (after several seconds of thought), street (again after several seconds) and floor.  She registered 3/3 and recalled 3/3 after distraction without cueing.  Attention and concentration: 1/5 serial sevens, 5/5 spelling world backward.  Fund of knowledge: Able to name current and first president.  Visuospatial: Intact clock contour, numbering and hand placement.    Cranial Nerves:  Funduscopic exam was not well visualized bilaterally on nondilated exam.  Pupils were equal, round and reactive to light with no relative afferent pupillary defect.  Extraocular movements were intact and conjugate without nystagmus.  No ptosis.  Visual fields were full to confrontation tested binocularly.    Facial motor function was symmetrically intact.  Mildly hard of hearing.  No dysarthria.  Shoulder shrug was symmetric.  Tongue protrusion was midline.    Motor: Muscle tone was normal in all limbs.    Coordination: Fairly continuous nodding head tremor throughout the visit.  Moderate right and mild-moderate left postural hand tremor.  Moderate bilateral kinetic  tremor on finger to finger maneuver without dysmetria.  No rest tremor of the limbs.  No myoclonus or dystonic posturing.  No bradykinesia.    Gait: Smooth and stable observed with rollator, at a moderate pace.  Moderately flexed posture at waist.      Assessment/Plan     1 her tremor is only moderately controlled with the present regimen but she does not wish to increase primidone or propranolol at this point.  She is to contact the office if she changes her mind.  For primidone monitoring I will check CBC.    From the standpoint of lumbar stenosis she is doing well with the rollator and not endorsing obvious symptoms of neurogenic claudication.  She has not been falling.    We discussed her memory concerns.  She does well on office testing and I discussed that I do not get an impression of dementia or incipient dementia.  I think she is likely noting normal age associated cognitive decline.  I did recommend checking methylmalonic acid level with regard to the adequacy of her current B12 replacement regimen.  I also recommended checking a primidone/phenobarbital level to be sure it is not in the toxic range.    The office will contact her with lab results.    Otherwise I advised that she follow-up in the office in 1 year.

## 2024-01-26 ENCOUNTER — OFFICE VISIT (OUTPATIENT)
Dept: PRIMARY CARE | Facility: CLINIC | Age: 84
End: 2024-01-26
Payer: MEDICARE

## 2024-01-26 ENCOUNTER — LAB (OUTPATIENT)
Dept: LAB | Facility: LAB | Age: 84
End: 2024-01-26
Payer: MEDICARE

## 2024-01-26 VITALS
HEART RATE: 63 BPM | WEIGHT: 171 LBS | DIASTOLIC BLOOD PRESSURE: 73 MMHG | OXYGEN SATURATION: 95 % | BODY MASS INDEX: 38.34 KG/M2 | SYSTOLIC BLOOD PRESSURE: 147 MMHG

## 2024-01-26 DIAGNOSIS — R41.3 COMPLAINTS OF MEMORY DISTURBANCE: ICD-10-CM

## 2024-01-26 DIAGNOSIS — G25.0 ESSENTIAL TREMOR: ICD-10-CM

## 2024-01-26 DIAGNOSIS — R26.89 IMBALANCE: ICD-10-CM

## 2024-01-26 DIAGNOSIS — M25.562 ACUTE PAIN OF LEFT KNEE: ICD-10-CM

## 2024-01-26 DIAGNOSIS — M54.16 LUMBAR RADICULOPATHY: ICD-10-CM

## 2024-01-26 DIAGNOSIS — E11.9 TYPE 2 DIABETES MELLITUS WITHOUT COMPLICATION, WITHOUT LONG-TERM CURRENT USE OF INSULIN (MULTI): Primary | ICD-10-CM

## 2024-01-26 DIAGNOSIS — F41.9 ANXIETY: ICD-10-CM

## 2024-01-26 DIAGNOSIS — M85.80 OSTEOPENIA, UNSPECIFIED LOCATION: ICD-10-CM

## 2024-01-26 PROBLEM — R09.89 CHEST CONGESTION: Status: ACTIVE | Noted: 2024-01-26

## 2024-01-26 PROBLEM — R09.89 PULMONARY CONGESTION: Status: ACTIVE | Noted: 2024-01-26

## 2024-01-26 PROBLEM — Z86.69 HISTORY OF CATARACT: Status: ACTIVE | Noted: 2024-01-26

## 2024-01-26 PROBLEM — Z86.39 HISTORY OF DIABETES MELLITUS: Status: ACTIVE | Noted: 2024-01-26

## 2024-01-26 PROBLEM — Z86.79 HISTORY OF HYPERTENSION: Status: ACTIVE | Noted: 2024-01-26

## 2024-01-26 PROBLEM — H26.40 SECONDARY CATARACT: Status: ACTIVE | Noted: 2024-01-26

## 2024-01-26 PROCEDURE — 80188 ASSAY OF PRIMIDONE: CPT

## 2024-01-26 PROCEDURE — 83921 ORGANIC ACID SINGLE QUANT: CPT

## 2024-01-26 PROCEDURE — 99214 OFFICE O/P EST MOD 30 MIN: CPT | Performed by: PHYSICIAN ASSISTANT

## 2024-01-26 PROCEDURE — 1160F RVW MEDS BY RX/DR IN RCRD: CPT | Performed by: PHYSICIAN ASSISTANT

## 2024-01-26 PROCEDURE — 1157F ADVNC CARE PLAN IN RCRD: CPT | Performed by: PHYSICIAN ASSISTANT

## 2024-01-26 PROCEDURE — 1036F TOBACCO NON-USER: CPT | Performed by: PHYSICIAN ASSISTANT

## 2024-01-26 PROCEDURE — 3078F DIAST BP <80 MM HG: CPT | Performed by: PHYSICIAN ASSISTANT

## 2024-01-26 PROCEDURE — 1126F AMNT PAIN NOTED NONE PRSNT: CPT | Performed by: PHYSICIAN ASSISTANT

## 2024-01-26 PROCEDURE — 1159F MED LIST DOCD IN RCRD: CPT | Performed by: PHYSICIAN ASSISTANT

## 2024-01-26 PROCEDURE — 85027 COMPLETE CBC AUTOMATED: CPT

## 2024-01-26 PROCEDURE — 36415 COLL VENOUS BLD VENIPUNCTURE: CPT

## 2024-01-26 PROCEDURE — 3077F SYST BP >= 140 MM HG: CPT | Performed by: PHYSICIAN ASSISTANT

## 2024-01-26 NOTE — PROGRESS NOTES
Subjective   Monica Hernandez is a 83 y.o. female who presents for Follow-up, left knee pain, and physical therapy for back . Generally doing well. Currently c/o:    Left knee pain: occurred 1x during the night. States she tried rubbing OTC nerve balm, which has helped. No recurrences since    Lumbar spinal stenosis: chronic. taking tylenol for pain relief. She is interested in completing PT. States pain is worst in the AM, improves over the course of the day    HTN: taking amlodipine 5mg, losartan 100mg, propranolol 80mg, and spironolactone-hydrochlorothiazide 25-20mg. She does not check BP at home but she does have a monitor. Denies any headache, dizziness, chest pain, SOB/CALLAHAN, palpitations, LE edema. Follows with cardiology.     CAD: taking zetia 10mg and rosuvastatin 40mg.     T2DM: managed through diet and exercise. Does not check BG levels at home. Last Hba1c was 5.5% 12/2023. Urine albumin UTD, last 12/2023. Diabetic eye exam: due, last 2021. Diabetic foot exam: none, checks feet often for cuts and sores. No neuropathy symptoms.     Anxiety: Stable on escitalopram 10 mg.  No thoughts of self or other harm    Tremors: taking primidone 125mg in AM and 150mg in PM and propranolol 80mg 3x daily.     Osteopenia: taking alendronate 70mg weekly with daily calcium + vitamin D.      Health maintenance:  Immunizations:  -Flu UTD, 2023  -Pneumococcal: UTD  -Shingrix: UTD  -Tdap: UTD, last 2019  Mammogram UTD (last 8/14/23)  Colon cancer screening -not indicated due to age  DEXA UTD (last 7/22/2022)  No healthcare POA/ living will     Last MCR / CPE: 6/13/23 (MCR ADV)     12 point ROS reviewed and negative other than as stated in HPI    /73   Pulse 63   Wt 77.6 kg (171 lb)   SpO2 95%   BMI 38.34 kg/m²   Objective   Physical Exam  Vitals reviewed.   Constitutional:       General: She is not in acute distress.     Appearance: Normal appearance. She is not ill-appearing.   HENT:      Head: Normocephalic and  atraumatic.   Eyes:      General: No scleral icterus.     Extraocular Movements: Extraocular movements intact.      Conjunctiva/sclera: Conjunctivae normal.      Pupils: Pupils are equal, round, and reactive to light.   Cardiovascular:      Rate and Rhythm: Normal rate and regular rhythm.      Heart sounds: Normal heart sounds. No murmur heard.     No friction rub. No gallop.   Pulmonary:      Effort: Pulmonary effort is normal. No respiratory distress.      Breath sounds: Normal breath sounds. No stridor. No wheezing, rhonchi or rales.   Musculoskeletal:      Cervical back: Normal range of motion.      Right lower leg: No edema.      Left lower leg: No edema.   Skin:     General: Skin is warm and dry.   Neurological:      Mental Status: She is alert and oriented to person, place, and time. Mental status is at baseline.      Cranial Nerves: No cranial nerve deficit.      Gait: Gait normal.   Psychiatric:         Mood and Affect: Mood normal.         Behavior: Behavior normal.         Assessment/Plan   Problem List Items Addressed This Visit       Anxiety     Stable.  Continue escitalopram 10 mg daily.         Relevant Medications    escitalopram (Lexapro) 10 mg tablet    Diabetes mellitus type 2, uncomplicated (CMS/HCC) - Primary     Well controlled through diet and exercise. Hemoglobin A1c and urine albumin UTD.  Encouraged to schedule for diabetic foot and eye exams if not up-to-date.         Left knee pain     RICE therapy encouraged. Take tylenol arthritis PRN for pain.          Lumbar radiculopathy     Take tylenol arthritis PRN for pain. Referral to PT placed. Can refer to pain management if interested.          Relevant Orders    Referral to Physical Therapy    Osteopenia     Take alendronate 70mg weekly with calcium + vitamin D. Perform weight bearing exercises as tolerated         Relevant Medications    alendronate (Fosamax) 70 mg tablet        Follow up in 3-4 months or sooner as needed

## 2024-01-27 LAB
ERYTHROCYTE [DISTWIDTH] IN BLOOD BY AUTOMATED COUNT: 15.9 % (ref 11.5–14.5)
HCT VFR BLD AUTO: 42.7 % (ref 36–46)
HGB BLD-MCNC: 12.8 G/DL (ref 12–16)
MCH RBC QN AUTO: 22.3 PG (ref 26–34)
MCHC RBC AUTO-ENTMCNC: 30 G/DL (ref 32–36)
MCV RBC AUTO: 75 FL (ref 80–100)
NRBC BLD-RTO: 0 /100 WBCS (ref 0–0)
PLATELET # BLD AUTO: 262 X10*3/UL (ref 150–450)
RBC # BLD AUTO: 5.73 X10*6/UL (ref 4–5.2)
WBC # BLD AUTO: 6.1 X10*3/UL (ref 4.4–11.3)

## 2024-01-30 LAB
PHENOBARB SERPL-MCNC: 19.3 UG/ML (ref 10–40)
PRIMIDONE SERPL-MCNC: 6.4 UG/ML (ref 5–10)
SCAN RESULT: NORMAL

## 2024-02-01 LAB — METHYLMALONATE SERPL-SCNC: 0.16 UMOL/L (ref 0–0.4)

## 2024-02-02 ENCOUNTER — TELEPHONE (OUTPATIENT)
Dept: NEUROLOGY | Facility: CLINIC | Age: 84
End: 2024-02-02
Payer: MEDICARE

## 2024-02-02 NOTE — TELEPHONE ENCOUNTER
----- Message from Nate New MD sent at 2/2/2024 10:55 AM EST -----  Please inform her that her blood tests are okay.

## 2024-02-03 PROBLEM — H60.90 OTITIS EXTERNA: Status: RESOLVED | Noted: 2023-06-13 | Resolved: 2024-02-03

## 2024-02-03 RX ORDER — ALENDRONATE SODIUM 70 MG/1
70 TABLET ORAL
Qty: 12 TABLET | Refills: 1 | Status: SHIPPED | OUTPATIENT
Start: 2024-02-03

## 2024-02-03 RX ORDER — ESCITALOPRAM OXALATE 10 MG/1
10 TABLET ORAL DAILY
Qty: 90 TABLET | Refills: 1 | Status: SHIPPED | OUTPATIENT
Start: 2024-02-03 | End: 2024-03-11 | Stop reason: SDUPTHER

## 2024-02-03 NOTE — ASSESSMENT & PLAN NOTE
Take tylenol arthritis PRN for pain. Referral to PT placed. Can refer to pain management if interested.

## 2024-02-03 NOTE — ASSESSMENT & PLAN NOTE
Take alendronate 70mg weekly with calcium + vitamin D. Perform weight bearing exercises as tolerated

## 2024-02-12 ENCOUNTER — EVALUATION (OUTPATIENT)
Dept: PHYSICAL THERAPY | Facility: CLINIC | Age: 84
End: 2024-02-12
Payer: MEDICARE

## 2024-02-12 DIAGNOSIS — M54.16 LUMBAR RADICULOPATHY: ICD-10-CM

## 2024-02-12 PROCEDURE — 97162 PT EVAL MOD COMPLEX 30 MIN: CPT | Mod: GP | Performed by: PHYSICAL THERAPIST

## 2024-02-12 ASSESSMENT — PAIN SCALES - GENERAL: PAINLEVEL_OUTOF10: 6

## 2024-02-12 ASSESSMENT — PAIN - FUNCTIONAL ASSESSMENT: PAIN_FUNCTIONAL_ASSESSMENT: 0-10

## 2024-02-12 NOTE — PROGRESS NOTES
Physical Therapy Evaluation and Treatment      Patient Name: Monica Hernandez  MRN: [id number]  Today's Date: 2/12/2024  Time Calculation  Start Time: 1625  Stop Time: 1702  Time Calculation (min): 37 min  Moderate complexity due to patient's clinical presentation being evolving with changing characteristics, with comorbidities to include chronic back pain; spinal stenosis; parkinson type disorder, all of which may negatively impact rehab tolerance and progression.    Insurance:  Visit number: Eval of 8 (requested)  Authorization info: AUTH REQUIRED; $35 co pay   Insurance Type: Payor: [employer]ICARE / Plan: [employer] MEDICARE ADVANTAGE / Product Type: *No Product type* /     Current Problem:   1. Lumbar radiculopathy  Referral to Physical Therapy    Follow Up In Physical Therapy          Subjective    General:  General  General Comment: Pt has chronic history of spinal stenosis . She has gone through therapy at [facility] about a year to 2 years ago. She noted slight improvement. She has complaint of L side radicular symptoms. She has been to pain management and has gotten MRI but she no longers goes to pain management. She does not have pain in sitting but when she gets up and walks there is increased discomfort. She also notes difficulty in AM with increased difficulty with walking however once she gets up and moves around the better it is.  Precautions:  Precautions  Precautions Comment: Falls  Pain:  Pain Assessment  Pain Assessment: 0-10  Pain Score: 6  Pain Type: Chronic pain  Pain Location: Back  Home Living:   Lives in spouse; ranch level home with basement however she does not go into basement. 3 steps with one rail and handle.   Prior Level of Function:  Prior Function Per Pt/Caregiver Report  Level of [place]: Independent with ADLs and functional transfers    Objective   General Assessments:  Posture Comment: forward flexed    Range of Motion Comments: lumbar ROM grossly within functional limits given  stenosis    Strength Comments: B LE 4+/5 throughout hip    Flexibility Comment: B HS tightness wtihin functional limits  Functional Assessments:  Gait Comment: forward flexed, reduced bart, use of rollator  , Bed Mobility Comment: Riccardo requried during supine to sit  , and Transfers Comment: B UE requried    Outcome Measures:  Other Measures  Oswestry Disablity Index (SHABBIR): 20     Treatments:  Therapeutic Exercise  Therapeutic Exercise Performed: Yes  Therapeutic Exercise Activity 1: Access Code [email or web address]  - Supine Posterior Pelvic Tilt  - 1 x daily - 7 x weekly - 3 sets - 10 reps  - Hooklying Single Knee to Chest  - 1 x daily - 7 x weekly - 3 sets - 5 reps - 10-15 seconds hold  - Supine Hamstring Stretch  - 1 x daily - 7 x weekly - 3 sets - 3 reps - 10-15 seconds hold    Assessment   Assessment:  PT Assessment Results: Decreased strength, Decreased range of motion, Decreased endurance, Impaired balance, Decreased mobility, Pain  Rehab Prognosis: Good  Assessment Comment: Pt is a 83 y.o female presenting to therapy with chronic back pain. Pt presents with symptoms of spinal stenosis finding relief in flexed position. She has good strength but functional weakness evident. At this time pt would benefit from skilled physical therapy in order to promote functional strength and reduction in pain.    Plan:  Treatment/Interventions: Dry needling, Electrical stimulation, Gait training, Hot pack, Manual therapy, Mechanical traction, Neuromuscular re-education, Taping techniques, Therapeutic activities, Therapeutic exercises  PT Plan: Skilled PT  PT Frequency: 1 time per week  Duration: 8 visits  Onset Date: 02/12/24  Number of Treatments Authorized: Auth Required  Rehab Potential: Good  Plan of Care Agreement: Patient    OP EDUCATION:  Outpatient Education  Individual(s) Educated: Patient  Risk and Benefits Discussed with Patient/Caregiver/Other: yes  Patient/Caregiver Demonstrated Understanding: yes  Plan of Care Discussed  and Agreed Upon: yes  Patient Response to Education: Patient/Caregiver Verbalized Understanding of Information, Patient/Caregiver Performed Return Demonstration of Exercises/Activities, Patient/Caregiver Asked Appropriate Questions    Goals:  Active       PT Problem       PT Goal 1       Start:  02/12/24    Expected End:  03/23/24       Pt will be 50% IND with HEP in 4 weeks in order to progress with therapy.          PT Goal 2       Start:  02/12/24    Expected End:  05/02/24       Pt will be 100% IND with HEP in 8 weeks in order to maintain progress with therapy.           PT Goal 3       Start:  02/12/24    Expected End:  05/02/24       Pt will be able to perform standing/walking for 30 min in 8 weeks required for cleaning, washing dishes, preparing food and walking community distances for grocery shopping.          PT Goal 4       Start:  02/12/24    Expected End:  03/23/24       Pt will demonstrate subjective improvement of ADLs and recreational activities through improved score of 10 on SHABBIR in 4 weeks for improved dressing and cleaning.          PT Goal 5       Start:  02/12/24    Expected End:  05/02/24       Pt will reduce pain levels to no more than 3/10 in 8 weeks in order to improve ambulation.    Personal Goal

## 2024-02-29 ENCOUNTER — TREATMENT (OUTPATIENT)
Dept: PHYSICAL THERAPY | Facility: CLINIC | Age: 84
End: 2024-02-29
Payer: MEDICARE

## 2024-02-29 DIAGNOSIS — M54.16 LUMBAR RADICULOPATHY: ICD-10-CM

## 2024-02-29 PROCEDURE — 97110 THERAPEUTIC EXERCISES: CPT | Mod: GP,CQ

## 2024-02-29 ASSESSMENT — PAIN - FUNCTIONAL ASSESSMENT: PAIN_FUNCTIONAL_ASSESSMENT: 0-10

## 2024-02-29 ASSESSMENT — PAIN SCALES - GENERAL: PAINLEVEL_OUTOF10: 0 - NO PAIN

## 2024-02-29 NOTE — PROGRESS NOTES
"Physical Therapy Treatment    Patient Name: Monica Hernandez  MRN: [id number]  Today's Date: 2/29/2024  Time Calculation  Start Time: 1500  Stop Time: 1539  Time Calculation (min): 39 min  PT Therapeutic Procedures Time Entry  Therapeutic Exercise Time Entry: 39    Insurance:  Visit number: 2 of 8  Authorization info: KAILA ANDRADE JRI - AUTH THRU CARELON /     7 Visits 2/29-4/28     Insurance Type: Payor: [employer] / Plan: [employer] ADVANTAGE / Product Type: *No Product type* /     Current Problem   1. Lumbar radiculopathy  Follow Up In Physical Therapy          Subjective   General    Patient reports continuous pain in back and in legs, pain is the worst in the mornings. States she is performing HEP which she doesn't think are helping. States her back only hurts in the morning or if she sits for longer then 45 minutes and goes to stand up. States she has a hard time doing exercises in bed.     Precautions:   None     Pain   Pain Assessment: 0-10  Pain Score: 0 - No pain    Post Treatment Pain Level   0/10    Objective   Valsalva during therex    Poor standing tolerance    Treatments:  Therapeutic Exercise:  Therapeutic Exercise  Therapeutic Exercise Performed: Yes  Therapeutic Exercise Activity 1: Seated HS stretch B 3 x 30\"  Therapeutic Exercise Activity 2: yellow SB roll outs 10\" H x 10  Therapeutic Exercise Activity 3: seated diapharagmatic breathing with TA engagement and hip ADD ISO 10x3  Therapeutic Exercise Activity 4: STS with eccentric control 10x2  Therapeutic Exercise Activity 5: lateral stepping with TA engagement  Therapeutic Exercise Activity 6: abdom press downs with SB and diaphragmatic breathig x10      Assessment   Assessment:    Focus on BLE mobility and strength as well as core stability for relief from LBP. Holding her breath throughout with non-fluid movements and easily fatigued. Rest breaks required throughout with cues for diaphragmatic breathing for most therapeutic benefit.     Plan: "    Continue per pt POC    OP EDUCATION:   Updated HEP    Goals:   Active       PT Problem       PT Goal 1       Start:  02/12/24    Expected End:  03/23/24       Pt will be 50% IND with HEP in 4 weeks in order to progress with therapy.          PT Goal 2       Start:  02/12/24    Expected End:  05/02/24       Pt will be 100% IND with HEP in 8 weeks in order to maintain progress with therapy.           PT Goal 3       Start:  02/12/24    Expected End:  05/02/24       Pt will be able to perform standing/walking for 30 min in 8 weeks required for cleaning, washing dishes, preparing food and walking community distances for grocery shopping.          PT Goal 4       Start:  02/12/24    Expected End:  03/23/24       Pt will demonstrate subjective improvement of ADLs and recreational activities through improved score of 10 on SHABBIR in 4 weeks for improved dressing and cleaning.          PT Goal 5       Start:  02/12/24    Expected End:  05/02/24       Pt will reduce pain levels to no more than 3/10 in 8 weeks in order to improve ambulation.    Personal Goal

## 2024-03-05 ENCOUNTER — HOSPITAL ENCOUNTER (OUTPATIENT)
Dept: RADIOLOGY | Facility: HOSPITAL | Age: 84
Discharge: HOME | End: 2024-03-05
Payer: MEDICARE

## 2024-03-05 DIAGNOSIS — E04.1 THYROID NODULE: ICD-10-CM

## 2024-03-05 PROCEDURE — 76536 US EXAM OF HEAD AND NECK: CPT

## 2024-03-05 PROCEDURE — 76536 US EXAM OF HEAD AND NECK: CPT | Performed by: RADIOLOGY

## 2024-03-07 ENCOUNTER — TREATMENT (OUTPATIENT)
Dept: PHYSICAL THERAPY | Facility: CLINIC | Age: 84
End: 2024-03-07
Payer: MEDICARE

## 2024-03-07 DIAGNOSIS — M54.16 LUMBAR RADICULOPATHY: ICD-10-CM

## 2024-03-07 DIAGNOSIS — E04.1 THYROID NODULE: Primary | ICD-10-CM

## 2024-03-07 PROCEDURE — 97110 THERAPEUTIC EXERCISES: CPT | Mod: GP | Performed by: PHYSICAL THERAPIST

## 2024-03-07 RX ORDER — LEVOTHYROXINE SODIUM 150 UG/1
150 TABLET ORAL
Qty: 30 TABLET | Refills: 3 | Status: SHIPPED | OUTPATIENT
Start: 2024-03-07 | End: 2025-03-07

## 2024-03-07 ASSESSMENT — PAIN SCALES - GENERAL: PAINLEVEL_OUTOF10: 0 - NO PAIN

## 2024-03-07 ASSESSMENT — PAIN - FUNCTIONAL ASSESSMENT: PAIN_FUNCTIONAL_ASSESSMENT: 0-10

## 2024-03-07 NOTE — RESULT ENCOUNTER NOTE
Please call the patient regarding her Thyroid ultrasound. Left lower lobe nodule has gotten some larger. Repeat ultrasound in 6 months

## 2024-03-07 NOTE — PROGRESS NOTES
"Physical Therapy Treatment    Patient Name: Monica Hernandez  MRN: [id number]  Today's Date: 3/7/2024  Time Calculation  Start Time: 1450  Stop Time: 1530  Time Calculation (min): 40 min  PT Therapeutic Procedures Time Entry  Therapeutic Exercise Time Entry: 40    Insurance:  Visit number: 2 of 7  Authorization info: 7 Visits 2/29-4/28   Insurance Type: Payor: [employer]IC[employer] / Plan: [employer] MEDICARE ADVANTAGE / Product Type: *No Product type* /     Current Problem   1. Lumbar radiculopathy  Follow Up In Physical Therapy          Subjective   General   General Comment: Pt reports no back pain but is having B leg pain especially in the morning.  Precautions:  Precautions  Precautions Comment: Falls  Pain   Pain Assessment: 0-10  Pain Score: 0 - No pain  Post Treatment Pain Level 0    Objective   Difficulty with core control (however pt has history of 5 C-sections)    Treatments:  Therapeutic Exercise:  Therapeutic Exercise  Therapeutic Exercise Performed: Yes  Therapeutic Exercise Activity 1: NuStep L3 8'  Therapeutic Exercise Activity 2: seated roll out with use of rollator for home 10x 10\"  Therapeutic Exercise Activity 3: seated SKTC 10\" 10x2  Therapeutic Exercise Activity 4: Seated HS stretch B 3 x 30\"  Therapeutic Exercise Activity 5: seated abdominal brace 10x3  Therapeutic Exercise Activity 6: abdom press downs with SB and diaphragmatic breathig x10      Assessment   Assessment:   PT Assessment  Assessment Comment: Pt tolerated session well, focus on core strength and flexibility to reduce compression of lumbar spine.    Plan:    Continue with lumbar stability and decompression exercises    OP EDUCATION:   Updated HEP: Access Code [email or web address]     Goals:   Active       PT Problem       PT Goal 1       Start:  02/12/24    Expected End:  03/23/24       Pt will be 50% IND with HEP in 4 weeks in order to progress with therapy.          PT Goal 2       Start:  02/12/24    Expected End:  05/02/24       Pt will be 100% IND " with HEP in 8 weeks in order to maintain progress with therapy.           PT Goal 3       Start:  02/12/24    Expected End:  05/02/24       Pt will be able to perform standing/walking for 30 min in 8 weeks required for cleaning, washing dishes, preparing food and walking community distances for grocery shopping.          PT Goal 4       Start:  02/12/24    Expected End:  03/23/24       Pt will demonstrate subjective improvement of ADLs and recreational activities through improved score of 10 on SHABBIR in 4 weeks for improved dressing and cleaning.          PT Goal 5       Start:  02/12/24    Expected End:  05/02/24       Pt will reduce pain levels to no more than 3/10 in 8 weeks in order to improve ambulation.    Personal Goal

## 2024-03-08 DIAGNOSIS — G25.0 ESSENTIAL TREMOR: Primary | ICD-10-CM

## 2024-03-08 RX ORDER — PRIMIDONE 50 MG/1
TABLET ORAL
Qty: 720 TABLET | Refills: 1 | Status: SHIPPED | OUTPATIENT
Start: 2024-03-08 | End: 2024-03-11 | Stop reason: SDUPTHER

## 2024-03-11 DIAGNOSIS — F41.9 ANXIETY: ICD-10-CM

## 2024-03-11 DIAGNOSIS — G25.0 ESSENTIAL TREMOR: ICD-10-CM

## 2024-03-11 RX ORDER — PRIMIDONE 50 MG/1
TABLET ORAL
Qty: 120 TABLET | Refills: 0 | Status: SHIPPED | OUTPATIENT
Start: 2024-03-11

## 2024-03-11 RX ORDER — ESCITALOPRAM OXALATE 10 MG/1
10 TABLET ORAL DAILY
Qty: 90 TABLET | Refills: 1 | Status: SHIPPED | OUTPATIENT
Start: 2024-03-11

## 2024-03-11 NOTE — TELEPHONE ENCOUNTER
She needs need a 15 day supply of her Primidone to go to her local [employer], because her mail order won't get to her in time  Pt out of med today 3-11-24

## 2024-03-14 ENCOUNTER — TREATMENT (OUTPATIENT)
Dept: PHYSICAL THERAPY | Facility: CLINIC | Age: 84
End: 2024-03-14
Payer: MEDICARE

## 2024-03-14 DIAGNOSIS — M54.16 LUMBAR RADICULOPATHY: ICD-10-CM

## 2024-03-14 PROCEDURE — 97110 THERAPEUTIC EXERCISES: CPT | Mod: GP,CQ

## 2024-03-14 ASSESSMENT — PAIN SCALES - GENERAL: PAINLEVEL_OUTOF10: 0 - NO PAIN

## 2024-03-14 ASSESSMENT — PAIN - FUNCTIONAL ASSESSMENT: PAIN_FUNCTIONAL_ASSESSMENT: 0-10

## 2024-03-14 NOTE — PROGRESS NOTES
"Physical Therapy Treatment    Patient Name: Monica Hernandez  MRN: [id number]  Today's Date: 3/14/2024  Time Calculation  Start Time: 1350  Stop Time: 1431  Time Calculation (min): 41 min  PT Therapeutic Procedures Time Entry  Therapeutic Exercise Time Entry: 41    Insurance:  Visit number: 3 of 7  Authorization info: 7 Visits 2/29-4/28    Insurance Type: Payor: [employer] MEDICARE / Plan: [employer] MEDICARE ADVANTAGE / Product Type: *No Product type* /     Current Problem   1. Lumbar radiculopathy  Follow Up In Physical Therapy          Subjective   General    Patient reports she sees no difference in her LBP, states she has been sleeping with pillow under her knees as instructed which has helped. States she doesn't do much walking or standing. States her back pain exacerbates the most when she stands for too long. Reports she really stands up without some kind of support.     Precautions:   H/o falls     Pain   Pain Assessment: 0-10  Pain Score: 0 - No pain  Post Treatment Pain Level   0/10    Objective   Poor static standing balance    Weak core    Difficulty with diaphragmatic breathing    Treatments:  Therapeutic Exercise:  Therapeutic Exercise  Therapeutic Exercise Performed: Yes  Therapeutic Exercise Activity 1: NuStep L5 for 6'  Therapeutic Exercise Activity 2: seated HS stretching 3 x 30\"  Therapeutic Exercise Activity 3: seated roll out with hip ADD stretch 5 x 15\"  Therapeutic Exercise Activity 4: STS with diaphragmatic breathing 10x2  Therapeutic Exercise Activity 5: seated ISO hip ADD with diaphragmatic breathing and abdom brace 10x3  Therapeutic Exercise Activity 6: abdom press downs with SB and diaphragmatic breathig x10  Therapeutic Exercise Activity 7: standing palloff press GTB Bx10         Assessment   Assessment:    Patient tolerating session fairly well this date but limited by mobility and tolerance to activity. Limited endurance and activity tolerance requiring frequent rest breaks d/t fatigue. Poor " standing balance during palloff press requiring frequent breaks for UE support. Difficulty performing diaphragmatic breathing with frequent reminders throughout session for TA engagement.     Plan:    Core/hip strength and stability, diaphragmatic breathing     OP EDUCATION:   Diaphragmatic breathing during HEP, frequent standing/walking at home    Goals:   Active       PT Problem       PT Goal 1       Start:  02/12/24    Expected End:  03/23/24       Pt will be 50% IND with HEP in 4 weeks in order to progress with therapy.          PT Goal 2       Start:  02/12/24    Expected End:  05/02/24       Pt will be 100% IND with HEP in 8 weeks in order to maintain progress with therapy.           PT Goal 3       Start:  02/12/24    Expected End:  05/02/24       Pt will be able to perform standing/walking for 30 min in 8 weeks required for cleaning, washing dishes, preparing food and walking community distances for grocery shopping.          PT Goal 4       Start:  02/12/24    Expected End:  03/23/24       Pt will demonstrate subjective improvement of ADLs and recreational activities through improved score of 10 on SHABBIR in 4 weeks for improved dressing and cleaning.          PT Goal 5       Start:  02/12/24    Expected End:  05/02/24       Pt will reduce pain levels to no more than 3/10 in 8 weeks in order to improve ambulation.    Personal Goal

## 2024-03-21 ENCOUNTER — TREATMENT (OUTPATIENT)
Dept: PHYSICAL THERAPY | Facility: CLINIC | Age: 84
End: 2024-03-21
Payer: MEDICARE

## 2024-03-21 DIAGNOSIS — M54.16 LUMBAR RADICULOPATHY: ICD-10-CM

## 2024-03-21 DIAGNOSIS — I10 BENIGN ESSENTIAL HYPERTENSION: ICD-10-CM

## 2024-03-21 PROCEDURE — 97110 THERAPEUTIC EXERCISES: CPT | Mod: GP,CQ

## 2024-03-21 RX ORDER — LOSARTAN POTASSIUM 100 MG/1
100 TABLET ORAL DAILY
Qty: 90 TABLET | Refills: 3 | Status: SHIPPED | OUTPATIENT
Start: 2024-03-21 | End: 2024-06-04 | Stop reason: ALTCHOICE

## 2024-03-21 ASSESSMENT — PAIN SCALES - GENERAL: PAINLEVEL_OUTOF10: 2

## 2024-03-21 ASSESSMENT — PAIN - FUNCTIONAL ASSESSMENT: PAIN_FUNCTIONAL_ASSESSMENT: 0-10

## 2024-03-21 NOTE — PROGRESS NOTES
"Physical Therapy Treatment    Patient Name: Monica Hernandez  MRN: [id number]  Today's Date: 3/21/2024  Time Calculation  Start Time: 1440  Stop Time: 1520  Time Calculation (min): 40 min  PT Therapeutic Procedures Time Entry  Therapeutic Exercise Time Entry: 40    Insurance:  Visit number: 4 of 7  Authorization info: 7 Visits 2/29-4/28   Insurance Type: Payor: [employer] MEDICARE / Plan: [employer] MEDICARE ADVANTAGE / Product Type: *No Product type* /     Current Problem   1. Lumbar radiculopathy  Follow Up In Physical Therapy          Subjective   General    Patient reports general \"aches and pains\" today, mostly in her back. States she sees no significant changes in her back. Reports she \"try's\" to do HEP, performing approximally 4x per week.     Precautions:   High fall risk    Pain   Pain Assessment: 0-10  Pain Score: 2  Pain Location: Back    Post Treatment Pain Level   2/10    Objective   Poor standing tolerance    Weak transverse abdominis     Treatments:  Therapeutic Exercise:  Therapeutic Exercise  Therapeutic Exercise Performed: Yes  Therapeutic Exercise Activity 1: NuStep L5 for 6'  Therapeutic Exercise Activity 2: seated HS stretching 3 x 30\"  Therapeutic Exercise Activity 3: seated roll out with hip ADD stretch 5 x 15\"  Therapeutic Exercise Activity 4: standing hip ABD BUE support 10x2 (declined further standing tasks d/t fatigue)  Therapeutic Exercise Activity 5: seated ISO hip ADD with diaphragmatic breathing and TA brace 10x2  Therapeutic Exercise Activity 6: seated abdom press downs with diaphragmatic breathing 10x2  Therapeutic Exercise Activity 7: STS with UE assist with diaphragmatic breathing to fatigue      Assessment   Assessment:    Patient is slowly progressing towards functional goals. Did not tolerate standing well today. Did perform standing hip abduction but required a total of 3 sitting rest breaks throughout and deferred further standing tasks secondary to fatigue. Cues still required for " proper diaphragmatic breathing for TA activation and engagement for full therapeutic benefit to reduce pain and increase functional strength.     Plan:    Attempt standing again    OP EDUCATION:   Diaphragmatic breathing during all functional tasks     Goals:   Active       PT Problem       PT Goal 1       Start:  02/12/24    Expected End:  03/23/24       Pt will be 50% IND with HEP in 4 weeks in order to progress with therapy.          PT Goal 2       Start:  02/12/24    Expected End:  05/02/24       Pt will be 100% IND with HEP in 8 weeks in order to maintain progress with therapy.           PT Goal 3       Start:  02/12/24    Expected End:  05/02/24       Pt will be able to perform standing/walking for 30 min in 8 weeks required for cleaning, washing dishes, preparing food and walking community distances for grocery shopping.          PT Goal 4       Start:  02/12/24    Expected End:  03/23/24       Pt will demonstrate subjective improvement of ADLs and recreational activities through improved score of 10 on SHABBIR in 4 weeks for improved dressing and cleaning.          PT Goal 5       Start:  02/12/24    Expected End:  05/02/24       Pt will reduce pain levels to no more than 3/10 in 8 weeks in order to improve ambulation.    Personal Goal

## 2024-03-28 ENCOUNTER — TREATMENT (OUTPATIENT)
Dept: PHYSICAL THERAPY | Facility: CLINIC | Age: 84
End: 2024-03-28
Payer: MEDICARE

## 2024-03-28 DIAGNOSIS — M54.16 LUMBAR RADICULOPATHY: ICD-10-CM

## 2024-03-28 PROCEDURE — 97110 THERAPEUTIC EXERCISES: CPT | Mod: GP | Performed by: PHYSICAL THERAPIST

## 2024-03-28 ASSESSMENT — PAIN - FUNCTIONAL ASSESSMENT: PAIN_FUNCTIONAL_ASSESSMENT: 0-10

## 2024-03-28 ASSESSMENT — PAIN SCALES - GENERAL: PAINLEVEL_OUTOF10: 0 - NO PAIN

## 2024-03-28 NOTE — PROGRESS NOTES
Physical Therapy Discharge Treatment    Patient Name: Monica Hernandez  MRN: [id number]  Today's Date: 3/28/2024  Time Calculation  Start Time: 1320  Stop Time: 1400  Time Calculation (min): 40 min  PT Therapeutic Procedures Time Entry  Therapeutic Exercise Time Entry: 40    Insurance:  Visit number: 5 of 7  Authorization info: 7 Visits 2/29-4/28   Insurance Type: Payor: [employer]ARE / Plan: [employer] MED[employer]ARE ADVANTAGE / Product Type: *No Product type* /     Current Problem   1. Lumbar radiculopathy  Follow Up In Physical Therapy          Subjective   General   General Comment: Pt doesn't feel therapy is helping and would like today to be her last day .  Precautions:  Precautions  Precautions Comment: None  Pain   Pain Assessment: 0-10  Pain Score: 0 - No pain  Post Treatment Pain Level 0    Objective   Core weakness remains evident     Treatments:  Therapeutic Exercise:  Therapeutic Exercise  Therapeutic Exercise Performed: Yes  Therapeutic Exercise Activity 1: NuStep L5 for 6'  Therapeutic Exercise Activity 2: rollator roll out stretch 10x  Therapeutic Exercise Activity 3: seated march 10x3  Therapeutic Exercise Activity 4: seated abdom press downs with diaphragmatic breathing 10x2      Assessment   Assessment:   PT Assessment  Assessment Comment: Pt stated she would like to be done with therapy, discussed improtance of continuing with mobiltiy increase pt verbalize understanding reviewed HEP today with use of hot pack. Will discharge patient this date.    Plan:    Discharge this date at patient request    OP EDUCATION:   Importance of mobility with arthritis in general     Goals:   Resolved       PT Problem       PT Goal 1 (Met)       Start:  02/12/24    Expected End:  03/23/24    Resolved:  03/28/24    Pt will be 50% IND with HEP in 4 weeks in order to progress with therapy.          PT Goal 2 (Adequate for Discharge)       Start:  02/12/24    Expected End:  05/02/24       Pt will be 100% IND with HEP in 8 weeks  in order to maintain progress with therapy.           PT Goal 3 (Adequate for Discharge)       Start:  02/12/24    Expected End:  05/02/24       Pt will be able to perform standing/walking for 30 min in 8 weeks required for cleaning, washing dishes, preparing food and walking community distances for grocery shopping.          PT Goal 4 (Adequate for Discharge)       Start:  02/12/24    Expected End:  03/23/24       Pt will demonstrate subjective improvement of ADLs and recreational activities through improved score of 10 on SHABBIR in 4 weeks for improved dressing and cleaning.          PT Goal 5 (Adequate for Discharge)       Start:  02/12/24    Expected End:  05/02/24       Pt will reduce pain levels to no more than 3/10 in 8 weeks in order to improve ambulation.    Personal Goal

## 2024-03-29 ENCOUNTER — LAB (OUTPATIENT)
Dept: LAB | Facility: LAB | Age: 84
End: 2024-03-29
Payer: MEDICARE

## 2024-04-03 DIAGNOSIS — E11.9 TYPE 2 DIABETES MELLITUS WITHOUT COMPLICATION, WITHOUT LONG-TERM CURRENT USE OF INSULIN (MULTI): Primary | ICD-10-CM

## 2024-04-04 ENCOUNTER — APPOINTMENT (OUTPATIENT)
Dept: PHYSICAL THERAPY | Facility: CLINIC | Age: 84
End: 2024-04-04
Payer: MEDICARE

## 2024-04-30 ENCOUNTER — LAB (OUTPATIENT)
Dept: LAB | Facility: LAB | Age: 84
End: 2024-04-30
Payer: MEDICARE

## 2024-04-30 DIAGNOSIS — E11.9 TYPE 2 DIABETES MELLITUS WITHOUT COMPLICATION, WITHOUT LONG-TERM CURRENT USE OF INSULIN (MULTI): ICD-10-CM

## 2024-04-30 LAB
EST. AVERAGE GLUCOSE BLD GHB EST-MCNC: 117 MG/DL
HBA1C MFR BLD: 5.7 %

## 2024-04-30 PROCEDURE — 83036 HEMOGLOBIN GLYCOSYLATED A1C: CPT

## 2024-04-30 PROCEDURE — 36415 COLL VENOUS BLD VENIPUNCTURE: CPT

## 2024-05-03 DIAGNOSIS — I10 BENIGN ESSENTIAL HYPERTENSION: Primary | ICD-10-CM

## 2024-05-05 RX ORDER — PROPRANOLOL HYDROCHLORIDE 80 MG/1
80 TABLET ORAL 3 TIMES DAILY
Qty: 270 TABLET | Refills: 3 | Status: SHIPPED | OUTPATIENT
Start: 2024-05-05 | End: 2025-05-05

## 2024-05-05 RX ORDER — AMLODIPINE BESYLATE 5 MG/1
5 TABLET ORAL DAILY
Qty: 90 TABLET | Refills: 3 | Status: SHIPPED | OUTPATIENT
Start: 2024-05-05 | End: 2025-05-05

## 2024-06-03 PROBLEM — R41.3 MEMORY IMPAIRMENT: Status: ACTIVE | Noted: 2024-01-26

## 2024-06-04 ENCOUNTER — OFFICE VISIT (OUTPATIENT)
Dept: CARDIOLOGY | Facility: CLINIC | Age: 84
End: 2024-06-04
Payer: MEDICARE

## 2024-06-04 VITALS — DIASTOLIC BLOOD PRESSURE: 62 MMHG | SYSTOLIC BLOOD PRESSURE: 146 MMHG | HEART RATE: 56 BPM

## 2024-06-04 DIAGNOSIS — I35.0 AORTIC VALVE STENOSIS, ETIOLOGY OF CARDIAC VALVE DISEASE UNSPECIFIED: Primary | ICD-10-CM

## 2024-06-04 PROCEDURE — 3077F SYST BP >= 140 MM HG: CPT | Performed by: INTERNAL MEDICINE

## 2024-06-04 PROCEDURE — 99214 OFFICE O/P EST MOD 30 MIN: CPT | Performed by: INTERNAL MEDICINE

## 2024-06-04 PROCEDURE — 1159F MED LIST DOCD IN RCRD: CPT | Performed by: INTERNAL MEDICINE

## 2024-06-04 PROCEDURE — 3078F DIAST BP <80 MM HG: CPT | Performed by: INTERNAL MEDICINE

## 2024-06-04 PROCEDURE — 1157F ADVNC CARE PLAN IN RCRD: CPT | Performed by: INTERNAL MEDICINE

## 2024-06-04 RX ORDER — LOSARTAN POTASSIUM AND HYDROCHLOROTHIAZIDE 25; 100 MG/1; MG/1
1 TABLET ORAL DAILY
Qty: 30 TABLET | Refills: 11 | Status: SHIPPED | OUTPATIENT
Start: 2024-06-04 | End: 2025-06-04

## 2024-06-20 DIAGNOSIS — E04.1 THYROID NODULE: ICD-10-CM

## 2024-06-20 RX ORDER — LEVOTHYROXINE SODIUM 150 UG/1
150 TABLET ORAL
Qty: 30 TABLET | Refills: 3 | Status: SHIPPED | OUTPATIENT
Start: 2024-06-20 | End: 2025-06-20

## 2024-06-26 DIAGNOSIS — M85.80 OSTEOPENIA, UNSPECIFIED LOCATION: ICD-10-CM

## 2024-06-26 RX ORDER — ALENDRONATE SODIUM 70 MG/1
70 TABLET ORAL
Qty: 12 TABLET | Refills: 1 | Status: SHIPPED | OUTPATIENT
Start: 2024-06-30

## 2024-06-27 DIAGNOSIS — F41.9 ANXIETY: ICD-10-CM

## 2024-06-27 RX ORDER — ESCITALOPRAM OXALATE 10 MG/1
10 TABLET ORAL DAILY
Qty: 90 TABLET | Refills: 1 | Status: SHIPPED | OUTPATIENT
Start: 2024-06-27

## 2024-06-29 ENCOUNTER — PATIENT MESSAGE (OUTPATIENT)
Dept: NEUROLOGY | Facility: CLINIC | Age: 84
End: 2024-06-29
Payer: MEDICARE

## 2024-06-29 DIAGNOSIS — G25.0 ESSENTIAL TREMOR: ICD-10-CM

## 2024-06-29 NOTE — PROGRESS NOTES
Primary Care Physician: Christopher D'Amico, DO  Date of Visit: 2024  2:00 PM EDT  Location of visit: [facility]     Chief Complaint: No chief complaint on file.    HPI / Summary:   Monica Hernandez is a 83 y.o. 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  Surgical Hx:   She has a past surgical history that includes Colonoscopy (2013);  section, classic (2013); Other surgical history (2019); Other surgical history (2019); Other surgical history (2019); Other surgical history (2016); and US guided fine percutaneous aspiration (2020).   Social Hx:   She reports that she has never smoked. She has never used smokeless tobacco. She reports that she does not drink alcohol and does not use drugs.  Family Hx:   Her family history is not on file.   Allergies:  Allergies   Allergen Reactions    Tetracyclines Unknown    Sulfamethoxazole-Trimethoprim Rash     Outpatient Medications:  Current Outpatient Medications   Medication Instructions    [START ON 2024] alendronate (FOSAMAX) 70 mg, oral, Once Weekly    amLODIPine (NORVASC) 5 mg, oral, Daily    calcium carbonate-vitamin D3 (Calcium 600 with Vitamin D3) 600 mg-10 mcg (400 unit) chewable tablet 1 tablet, oral, 2 times daily    cholecalciferol (Vitamin D-3) 125 MCG (5000 UT) capsule 1 capsule, oral, Daily    cyanocobalamin, vitamin B-12, 2,500 mcg tablet, sublingual  sublingual, Daily RT    escitalopram (LEXAPRO) 10 mg, oral, Daily    ezetimibe (Zetia) 10 mg tablet 1 tablet, oral, Daily    fluticasone (Flonase) 50 mcg/actuation nasal spray 2 sprays, Each Nostril, Daily PRN    hydrocortisone 2.5 % cream APPLY TWICE A DAY TO AFFECTED AREAS AS NEEDED    levothyroxine (SYNTHROID, LEVOXYL) 150 mcg, oral, Daily before breakfast    losartan-hydrochlorothiazide (Hyzaar) 100-25 mg tablet 1 tablet, oral, Daily    multivit-iron-minerals-folic acid (Centrum Silver) 0.4 mg-300 mcg- 250 mcg tab 1 tablet, oral, Daily    OneTouch Ultra Test strip Daily RT    primidone (Mysoline) 50 mg tablet Take 2 1/2 tablets in the Morning, 2 1/2 tablets in the Afternoon, 3 tablets at nightTake 2 1/2 tablets in the Morning, 2 1/2 tablets in the Afternoon, 3 tablets at night    propranolol (INDERAL) 80 mg, oral, 3 times daily    rosuvastatin (Crestor) 40 mg tablet 1 tablet, oral, Daily    spironolacton-hydrochlorothiaz (Aldactazide) 25-25 mg tablet 1 tablet, oral, Daily     Physical Exam:  Vitals:    06/04/24 1435   BP: 146/62   Pulse: 56     Wt Readings from Last 5 Encounters:   01/26/24 77.6 kg (171 lb)   01/25/24 77.1 kg (170 lb)   12/05/23 77.3 kg (170 lb 8 oz)   06/13/23 77.6 kg (171 lb)   06/06/23 75.8 kg (167 lb)     Physical Exam  JVP not elevated. Carotid impulses are 2+ without overlying bruit.   Chest exhibits fair to good air movement with completely clear breath sounds.   The cardiac rhythm is regular with no premature beats.   Normal S1 and S2. No gallop, murmur or rub, or click.   Abdomen is soft and benign without focal tenderness.   With no lower leg edema. The pedal pulses are intact.     Last Labs:  Lab on 04/30/2024   Component Date Value    Hemoglobin A1C 04/30/2024 5.7 (H)     Estimated Average Glucose 04/30/2024 117    Lab on 01/26/2024   Component Date Value    Methylmalonic Acid, S 01/26/2024 0.16     Primidone Value 01/26/2024 6.4     Phenobarbital Value 01/26/2024 19.3     Scan  Result 2024 See Scanned Result     WBC 2024 6.1     nRBC 2024 0.0     RBC 2024 5.73 (H)     Hemoglobin 2024 12.8     Hematocrit 2024 42.7     MCV 2024 75 (L)     MCH 2024 22.3 (L)     MCHC 2024 30.0 (L)     RDW 2024 15.9 (H)     Platelets 2024 262    Lab on 2023   Component Date Value    Cholesterol 2023 159     HDL-Cholesterol 2023 66.5     Cholesterol/HDL Ratio 2023 2.4     LDL Calculated 2023 67     VLDL 2023 25     Triglycerides 2023 126     Non HDL Cholesterol 2023 93     Glucose 2023 101 (H)     Sodium 2023 142     Potassium 2023 4.0     Chloride 2023 101     Bicarbonate 2023 34 (H)     Anion Gap 2023 11     Urea Nitrogen 2023 15     Creatinine 2023 0.76     eGFR 2023 78     Calcium 2023 10.5     Albumin 2023 4.4     Alkaline Phosphatase 2023 77     Total Protein 2023 7.4     AST 2023 20     Bilirubin, Total 2023 0.4     ALT 2023 24     Hemoglobin A1C 2023 5.5     Estimated Average Glucose 2023 111     Albumin, Urine Random 2023 <7.0     Creatinine, Urine Random 2023 110.8     Albumin/Creatinine Ratio 2023          Assessment/Plan         1. CAD. This patient is a very pleasant white female whose past history includes hypertension hyperlipidemia and glucose intolerance. She was a very remote smoker. She does have a family history of CAD including a older sister who  of MI at the age of 78 who is a diabetic beginning at the age of 17. She does have a brother however aged 83 who has 3 stents. She also has a sister age 78 rheumatoid arthritis who also has CHF and a minor CVA. The patient herself has not had any ongoing concerning chest discomfort. The patient did have a CT coronary calcium score performed on 3/24/2011 with a value of 225. Based on her risk factors and CT  coronary calcium score the patient subsequently had an echocardiogram performed on 01/23/2015 which showed normal LV chamber size with mild concentric LV hypertrophy and vigorous left ventricular systolic function with an estimated ejection fraction of greater than 65%. There was mild left atrial enlargement, mild aortic valve thickening, mild aortic valve insufficiency. The patient also had a pharmacological nuclear stress test on 01/23/2015 which showed normal myocardial perfusion with no defects and an LV ejection fraction of 55%. The patient is presently asymptomatic and will be monitored clinically. Prelim read of echo done today showed EF 65%, mild LVH, 1+ AI with 31 mm Hg gradient and mild MR, mild aortic sclerosis, mild LAE.  Patient does have a systolic ejection murmur.  Patient had pharmacologic nuclear stress testing done 12/2021 that was negative for ischemia.  The patient is presently feeling fairly well using a walker to ambulate  but less so in the house.  No lightheadedness dizziness palpitations or shortness of breath.  Patient doing fairly well without any new symptoms other than for some knee pain and had tremoring.  2. Glucose intolerance. The patient did ave lab work performed on 03/2021 which included a hemoglobin A1c of 6.8%. The patient presently is not on any oral hypoglycemic therapy.  3. Hyperlipidemia. The patient just started Crestor 40 mg daily with lipid panel performed on 06/2021 including cholesterol 181 LDL 94 HDL 55 triglyceride 158. Start Zetia.  Most recent lipid panel on rosuvastatin 40 mg daily.  Zetia 10 mg daily.  Will recheck a fasting lipid panel CMP included cholesterol 170 LDL 74 HDL 64 triglyceride 160 glycohemoglobin and UACR before and next visit.  4. Hypertension. Blood pressure is well controlled today. For now she will remain on amlodipine 5 mg daily rather than the 10 mg dose due to edema. She also will remain on losartan 100 mg daily, Aldactazide 25/25 mg daily  and propanolol 80 mg 3 times a day.  Lab work from 1512/15/2020 3/2022 included a glycohemoglobin of 5.8 normal CBC CMP panel  vitamin D 44. systolic blood pressure slightly elevated today and will change losartan 100 mg daily to losartan -25 mg daily.  Of the note the patient is also on Aldactazide 25-25 mg daily.  Will monitor renal parameters and electrolytes.  Lab work from 2023 included a normal SMA panel with creatinine of 0.76.  Glycohemoglobin was 5.5% UACR not detected CBC normal.  Glycohemoglobin was 5.7% 2024.    5. Anxiety  6. Positive family history of CAD  7. Remote smoking  8. Status post  section x5  9. Remote excision of ganglion cyst from the left wrist.  10. Tremor. The patient does have a very noticeable tremor despite the propranolol 80 mg 3 times a day and primidone 50 three times per day.  11. Minimal carotid vascular disease. The patient did have a carotid ultrasound on 2011 which showed minimal intimal thickening.   12. Obesity  13. Hx of thyroid nodule.   14. History of COVID-19 vaccine #1 and #2.  15.  Aortic valvular stenosis.  This patient did have an echocardiogram on 2023 estimating the LV ejection fraction at 60-65%.  She does however have moderately severe aortic valve stenosis peak systolic pressure gradient of 55 mmHg with 1+ aortic insufficiency.  Other findings include 1-2+ mitral valve regurgitation and moderate pulmonary hypertension estimated PA systolic pressure 51 mmHg.  Patient will have a repeat echocardiogram performed at time of next visit in 6 months.        Orders:  Orders Placed This Encounter   Procedures    Transthoracic echo (TTE) complete      Followup Appts:  Future Appointments   Date Time Provider Department Center   2024  2:00 PM Christopher D'Amico, DO [id number] [place]   2024  1:30 PM TIFFANI ULTRASOUND 2 [facility]   12/10/2024  2:30 PM EUC ECHO/STRESS [id number] [place]   12/10/2024  3:30 PM Paul WALKER  MD [place] [id number] [facility]   1/27/2025  3:30 PM Nate New MD [id number] [facility]           ____________________________________________________________  Paul Edward MD  [facility]  Assistant Clinical Professor, [facility]

## 2024-07-01 ENCOUNTER — TELEPHONE (OUTPATIENT)
Dept: NEUROLOGY | Facility: CLINIC | Age: 84
End: 2024-07-01
Payer: MEDICARE

## 2024-07-01 DIAGNOSIS — F41.9 ANXIETY: ICD-10-CM

## 2024-07-01 DIAGNOSIS — G25.0 ESSENTIAL TREMOR: ICD-10-CM

## 2024-07-01 RX ORDER — ESCITALOPRAM OXALATE 10 MG/1
10 TABLET ORAL DAILY
Qty: 90 TABLET | Refills: 1 | Status: SHIPPED | OUTPATIENT
Start: 2024-07-01

## 2024-07-01 RX ORDER — PRIMIDONE 50 MG/1
TABLET ORAL
Qty: 720 TABLET | Refills: 0 | OUTPATIENT
Start: 2024-07-01

## 2024-07-01 RX ORDER — PRIMIDONE 50 MG/1
TABLET ORAL
Qty: 120 TABLET | Refills: 0 | Status: CANCELLED | OUTPATIENT
Start: 2024-07-01

## 2024-07-01 RX ORDER — PRIMIDONE 50 MG/1
TABLET ORAL
Qty: 80 TABLET | Refills: 0 | Status: SHIPPED | OUTPATIENT
Start: 2024-07-01

## 2024-07-01 NOTE — TELEPHONE ENCOUNTER
----- Message from Monica Hernandez sent at 6/29/2024 11:40 AM EDT -----  Regarding: Primidone 50 mg tabs  Contact: [phone]  Dr. New, can you please call in to [facility] in [facility] for a 10 day supply, I'm completely out of it. Also I need a refill for the rest of the year. Thank you.

## 2024-07-01 NOTE — TELEPHONE ENCOUNTER
From: Monica Hernandez  To: Nate New  Sent: 6/29/2024 11:40 AM EDT  Subject: Primidone 50 mg tabs    Dr. New, can you please call in to [facility] in [facility] for a 10 day supply, I'm completely out of it. Also I need a refill for the rest of the year. Thank you.

## 2024-07-11 DIAGNOSIS — G25.0 ESSENTIAL TREMOR: ICD-10-CM

## 2024-07-11 RX ORDER — PRIMIDONE 50 MG/1
TABLET ORAL
Qty: 720 TABLET | Refills: 1 | OUTPATIENT
Start: 2024-07-11

## 2024-07-16 ENCOUNTER — APPOINTMENT (OUTPATIENT)
Dept: PRIMARY CARE | Facility: CLINIC | Age: 84
End: 2024-07-16
Payer: MEDICARE

## 2024-07-16 VITALS
OXYGEN SATURATION: 94 % | WEIGHT: 171 LBS | HEIGHT: 56 IN | SYSTOLIC BLOOD PRESSURE: 170 MMHG | HEART RATE: 70 BPM | DIASTOLIC BLOOD PRESSURE: 80 MMHG | BODY MASS INDEX: 38.46 KG/M2

## 2024-07-16 DIAGNOSIS — M85.80 OSTEOPENIA, UNSPECIFIED LOCATION: ICD-10-CM

## 2024-07-16 DIAGNOSIS — Z78.0 ASYMPTOMATIC MENOPAUSAL STATE: ICD-10-CM

## 2024-07-16 DIAGNOSIS — E03.9 HYPOTHYROIDISM, UNSPECIFIED TYPE: ICD-10-CM

## 2024-07-16 DIAGNOSIS — E78.2 MIXED HYPERLIPIDEMIA: ICD-10-CM

## 2024-07-16 DIAGNOSIS — Z00.00 MEDICARE ANNUAL WELLNESS VISIT, SUBSEQUENT: ICD-10-CM

## 2024-07-16 DIAGNOSIS — F41.9 ANXIETY: ICD-10-CM

## 2024-07-16 DIAGNOSIS — Z00.00 WELLNESS EXAMINATION: ICD-10-CM

## 2024-07-16 DIAGNOSIS — I10 BENIGN ESSENTIAL HYPERTENSION: ICD-10-CM

## 2024-07-16 DIAGNOSIS — E11.9 TYPE 2 DIABETES MELLITUS WITHOUT COMPLICATION, WITHOUT LONG-TERM CURRENT USE OF INSULIN (MULTI): Primary | ICD-10-CM

## 2024-07-16 DIAGNOSIS — I25.118 CORONARY ARTERY DISEASE OF NATIVE ARTERY OF NATIVE HEART WITH STABLE ANGINA PECTORIS (CMS-HCC): ICD-10-CM

## 2024-07-16 DIAGNOSIS — E55.9 VITAMIN D DEFICIENCY: ICD-10-CM

## 2024-07-16 DIAGNOSIS — Z12.31 ENCOUNTER FOR SCREENING MAMMOGRAM FOR MALIGNANT NEOPLASM OF BREAST: ICD-10-CM

## 2024-07-16 DIAGNOSIS — E66.01 CLASS 2 SEVERE OBESITY DUE TO EXCESS CALORIES WITH SERIOUS COMORBIDITY AND BODY MASS INDEX (BMI) OF 38.0 TO 38.9 IN ADULT (MULTI): ICD-10-CM

## 2024-07-16 PROCEDURE — 1170F FXNL STATUS ASSESSED: CPT | Performed by: STUDENT IN AN ORGANIZED HEALTH CARE EDUCATION/TRAINING PROGRAM

## 2024-07-16 PROCEDURE — 1036F TOBACCO NON-USER: CPT | Performed by: STUDENT IN AN ORGANIZED HEALTH CARE EDUCATION/TRAINING PROGRAM

## 2024-07-16 PROCEDURE — 3077F SYST BP >= 140 MM HG: CPT | Performed by: STUDENT IN AN ORGANIZED HEALTH CARE EDUCATION/TRAINING PROGRAM

## 2024-07-16 PROCEDURE — 99397 PER PM REEVAL EST PAT 65+ YR: CPT | Performed by: STUDENT IN AN ORGANIZED HEALTH CARE EDUCATION/TRAINING PROGRAM

## 2024-07-16 PROCEDURE — 1160F RVW MEDS BY RX/DR IN RCRD: CPT | Performed by: STUDENT IN AN ORGANIZED HEALTH CARE EDUCATION/TRAINING PROGRAM

## 2024-07-16 PROCEDURE — 99214 OFFICE O/P EST MOD 30 MIN: CPT | Performed by: STUDENT IN AN ORGANIZED HEALTH CARE EDUCATION/TRAINING PROGRAM

## 2024-07-16 PROCEDURE — 3079F DIAST BP 80-89 MM HG: CPT | Performed by: STUDENT IN AN ORGANIZED HEALTH CARE EDUCATION/TRAINING PROGRAM

## 2024-07-16 PROCEDURE — 1159F MED LIST DOCD IN RCRD: CPT | Performed by: STUDENT IN AN ORGANIZED HEALTH CARE EDUCATION/TRAINING PROGRAM

## 2024-07-16 PROCEDURE — 1157F ADVNC CARE PLAN IN RCRD: CPT | Performed by: STUDENT IN AN ORGANIZED HEALTH CARE EDUCATION/TRAINING PROGRAM

## 2024-07-16 PROCEDURE — 1124F ACP DISCUSS-NO DSCNMKR DOCD: CPT | Performed by: STUDENT IN AN ORGANIZED HEALTH CARE EDUCATION/TRAINING PROGRAM

## 2024-07-16 PROCEDURE — G0439 PPPS, SUBSEQ VISIT: HCPCS | Performed by: STUDENT IN AN ORGANIZED HEALTH CARE EDUCATION/TRAINING PROGRAM

## 2024-07-16 RX ORDER — PREDNISONE 20 MG/1
TABLET ORAL
COMMUNITY
Start: 2024-07-11

## 2024-07-16 ASSESSMENT — PATIENT HEALTH QUESTIONNAIRE - PHQ9
2. FEELING DOWN, DEPRESSED OR HOPELESS: NOT AT ALL
1. LITTLE INTEREST OR PLEASURE IN DOING THINGS: NOT AT ALL
SUM OF ALL RESPONSES TO PHQ9 QUESTIONS 1 AND 2: 0

## 2024-07-16 ASSESSMENT — ENCOUNTER SYMPTOMS
DEPRESSION: 0
OCCASIONAL FEELINGS OF UNSTEADINESS: 1
LOSS OF SENSATION IN FEET: 0

## 2024-07-16 ASSESSMENT — ACTIVITIES OF DAILY LIVING (ADL)
DRESSING: INDEPENDENT
TAKING_MEDICATION: INDEPENDENT
BATHING: INDEPENDENT
GROCERY_SHOPPING: INDEPENDENT
MANAGING_FINANCES: INDEPENDENT
DOING_HOUSEWORK: INDEPENDENT

## 2024-07-16 NOTE — PROGRESS NOTES
"Subjective   Reason for Visit: Monica Hernandez is an 83 y.o. female here for a Medicare Wellness visit.               HPI    Patient Care Team:  Christopher D'Amico, DO as PCP - General (Family Medicine)  Reva Fields MD as Primary Care Provider     Review of Systems    Objective   Vitals:  Ht 1.422 m (4' 8\")   BMI 38.34 kg/m²       Physical Exam    Assessment/Plan   Problem List Items Addressed This Visit    None           HPI: 83-year-old female presenting to establish care, follow-up on chronic concerns, [facility]/Medicare annual wellness exam.    DMII  Stable and doing well on dietary modifications alone.    HTN  Stable, tolerates current regimen well.  Follows with cardiology.  Recent medication changes.    HLD  Stable, tolerating current regimen well.    Anxiety  Stable, tolerating current regimen well.    Hypothyroidism  Stable, tolerating current regimen well.    Osteopenia  Stable, tolerating current regimen well.    Essential tremor  Stable, tolerating current regimen well.    12 point ROS reviewed and negative other than as stated in HPI      General: Alert, oriented, pleasant, in no acute distress  HEENT:      Head: normocephalic, atraumatic;      eyes: EOMI, no scleral icterus;   Neck: soft, supple, non-tender, no masses appreciated  CV: Heart with regular rate and rhythm, normal S1/S2, no murmurs  Lungs: CTAB without wheezing, rhonchi or rales; good respiratory effort, no increased work of breathing  Abdomen: soft, non-tender, non-distended, no masses appreciated  Extremities: Trace edema, no cyanosis  Neuro: Cranial nerves grossly intact; alert and oriented  Psych: Appropriate mood and affect    # HM  -CBC, CMP, Lipid panel, Vit D, TSH with reflex T4  -Vaccines:       Flu: Out of season      Shingrix: UTD      Pneumococcal: UTD      Tdap: UTD 2019  -Hiram w/ konstantin: Ordered  -Colonoscopy: No longer screening  -Osteoporosis screening: Ordered    #DMII  -Last A1c 5.7, 04/2024  - Diet controlled    #HTN  - " Above goal in office  - Currently on amlodipine 5 mg daily, losartan-HCTZ 100-25 mg daily  - Following with cardiology, defer any changes to them    #HLD #CAD  -Currently on rosuvastatin 40 mg daily, ezetimibe 10 mg daily  - Repeat lipid panel    #Anxiety   -Stable, doing well  - Continue escitalopram 10 mg daily    #hypothyroidism  - Continue levothyroxine 150 mg daily  - Repeat TSH    #Osteopenia  -Currently on alendronate 70 mg weekly    #Essential tremor  -Currently on primidone and propranolol  - Following with neurology    F/U 4-6 months, sooner if indicated    Chris D'Amico, DO

## 2024-07-17 DIAGNOSIS — G25.0 ESSENTIAL TREMOR: ICD-10-CM

## 2024-07-17 RX ORDER — PRIMIDONE 50 MG/1
TABLET ORAL
Qty: 720 TABLET | Refills: 1 | Status: SHIPPED | OUTPATIENT
Start: 2024-07-17

## 2024-07-17 NOTE — TELEPHONE ENCOUNTER
----- Message from Ginette MOREIRA sent at 7/17/2024 12:06 PM EDT -----  Stated pharmacy never received the primidone prescription, last refill was 3/9...[facility] script to [facility].  Please do not send to [facility] (mail script) anymore [facility].

## 2024-07-20 DIAGNOSIS — E78.2 MIXED HYPERLIPIDEMIA: Primary | ICD-10-CM

## 2024-07-22 RX ORDER — EZETIMIBE 10 MG/1
10 TABLET ORAL DAILY
Qty: 90 TABLET | Refills: 1 | Status: SHIPPED | OUTPATIENT
Start: 2024-07-22

## 2024-07-24 ENCOUNTER — LAB (OUTPATIENT)
Dept: LAB | Facility: LAB | Age: 84
End: 2024-07-24
Payer: MEDICARE

## 2024-07-24 DIAGNOSIS — Z00.00 MEDICARE ANNUAL WELLNESS VISIT, SUBSEQUENT: ICD-10-CM

## 2024-07-24 DIAGNOSIS — E66.01 CLASS 2 SEVERE OBESITY DUE TO EXCESS CALORIES WITH SERIOUS COMORBIDITY AND BODY MASS INDEX (BMI) OF 38.0 TO 38.9 IN ADULT (MULTI): ICD-10-CM

## 2024-07-24 DIAGNOSIS — E03.9 HYPOTHYROIDISM, UNSPECIFIED TYPE: Primary | ICD-10-CM

## 2024-07-24 DIAGNOSIS — F41.9 ANXIETY: ICD-10-CM

## 2024-07-24 DIAGNOSIS — Z78.0 ASYMPTOMATIC MENOPAUSAL STATE: ICD-10-CM

## 2024-07-24 DIAGNOSIS — E03.9 HYPOTHYROIDISM, UNSPECIFIED TYPE: ICD-10-CM

## 2024-07-24 DIAGNOSIS — E11.9 TYPE 2 DIABETES MELLITUS WITHOUT COMPLICATION, WITHOUT LONG-TERM CURRENT USE OF INSULIN (MULTI): ICD-10-CM

## 2024-07-24 DIAGNOSIS — I25.118 CORONARY ARTERY DISEASE OF NATIVE ARTERY OF NATIVE HEART WITH STABLE ANGINA PECTORIS (CMS-HCC): ICD-10-CM

## 2024-07-24 DIAGNOSIS — M85.80 OSTEOPENIA, UNSPECIFIED LOCATION: ICD-10-CM

## 2024-07-24 DIAGNOSIS — E55.9 VITAMIN D DEFICIENCY: ICD-10-CM

## 2024-07-24 DIAGNOSIS — Z00.00 WELLNESS EXAMINATION: ICD-10-CM

## 2024-07-24 DIAGNOSIS — I10 BENIGN ESSENTIAL HYPERTENSION: ICD-10-CM

## 2024-07-24 DIAGNOSIS — Z12.31 ENCOUNTER FOR SCREENING MAMMOGRAM FOR MALIGNANT NEOPLASM OF BREAST: ICD-10-CM

## 2024-07-24 DIAGNOSIS — E78.2 MIXED HYPERLIPIDEMIA: ICD-10-CM

## 2024-07-24 LAB
25(OH)D3 SERPL-MCNC: 51 NG/ML (ref 30–100)
ALBUMIN SERPL BCP-MCNC: 4.3 G/DL (ref 3.4–5)
ALP SERPL-CCNC: 64 U/L (ref 33–136)
ALT SERPL W P-5'-P-CCNC: 17 U/L (ref 7–45)
ANION GAP SERPL CALC-SCNC: 14 MMOL/L (ref 10–20)
AST SERPL W P-5'-P-CCNC: 15 U/L (ref 9–39)
BILIRUB SERPL-MCNC: 0.4 MG/DL (ref 0–1.2)
BUN SERPL-MCNC: 21 MG/DL (ref 6–23)
CALCIUM SERPL-MCNC: 9.9 MG/DL (ref 8.6–10.6)
CHLORIDE SERPL-SCNC: 100 MMOL/L (ref 98–107)
CHOLEST SERPL-MCNC: 146 MG/DL (ref 0–199)
CHOLESTEROL/HDL RATIO: 2.5
CO2 SERPL-SCNC: 29 MMOL/L (ref 21–32)
CREAT SERPL-MCNC: 0.63 MG/DL (ref 0.5–1.05)
EGFRCR SERPLBLD CKD-EPI 2021: 89 ML/MIN/1.73M*2
ERYTHROCYTE [DISTWIDTH] IN BLOOD BY AUTOMATED COUNT: 15 % (ref 11.5–14.5)
EST. AVERAGE GLUCOSE BLD GHB EST-MCNC: 114 MG/DL
GLUCOSE SERPL-MCNC: 103 MG/DL (ref 74–99)
HBA1C MFR BLD: 5.6 %
HCT VFR BLD AUTO: 41.5 % (ref 36–46)
HDLC SERPL-MCNC: 57.8 MG/DL
HGB BLD-MCNC: 12.5 G/DL (ref 12–16)
LDLC SERPL CALC-MCNC: 64 MG/DL
MCH RBC QN AUTO: 21.7 PG (ref 26–34)
MCHC RBC AUTO-ENTMCNC: 30.1 G/DL (ref 32–36)
MCV RBC AUTO: 72 FL (ref 80–100)
NON HDL CHOLESTEROL: 88 MG/DL (ref 0–149)
NRBC BLD-RTO: 0 /100 WBCS (ref 0–0)
PLATELET # BLD AUTO: 305 X10*3/UL (ref 150–450)
POTASSIUM SERPL-SCNC: 4 MMOL/L (ref 3.5–5.3)
PROT SERPL-MCNC: 7 G/DL (ref 6.4–8.2)
RBC # BLD AUTO: 5.75 X10*6/UL (ref 4–5.2)
SODIUM SERPL-SCNC: 139 MMOL/L (ref 136–145)
T4 FREE SERPL-MCNC: 2.98 NG/DL (ref 0.78–1.48)
TRIGL SERPL-MCNC: 123 MG/DL (ref 0–149)
TSH SERPL-ACNC: 0.03 MIU/L (ref 0.44–3.98)
VLDL: 25 MG/DL (ref 0–40)
WBC # BLD AUTO: 5.4 X10*3/UL (ref 4.4–11.3)

## 2024-07-24 PROCEDURE — 82306 VITAMIN D 25 HYDROXY: CPT

## 2024-07-24 PROCEDURE — 80061 LIPID PANEL: CPT

## 2024-07-24 PROCEDURE — 85027 COMPLETE CBC AUTOMATED: CPT

## 2024-07-24 PROCEDURE — 36415 COLL VENOUS BLD VENIPUNCTURE: CPT

## 2024-07-24 PROCEDURE — 84443 ASSAY THYROID STIM HORMONE: CPT

## 2024-07-24 PROCEDURE — 80053 COMPREHEN METABOLIC PANEL: CPT

## 2024-07-24 PROCEDURE — 84439 ASSAY OF FREE THYROXINE: CPT

## 2024-07-24 PROCEDURE — 83036 HEMOGLOBIN GLYCOSYLATED A1C: CPT

## 2024-07-24 NOTE — RESULT ENCOUNTER NOTE
TSH low, T4 high, indicating overtreatment with medication.  Has been stable for the past few years, as she recently changed her dosing, or change the way she takes it?  If not, would recommend cutting medication to 5 days a week (not skipping consecutive days), and repeating lab in 4 weeks.    Low MCV with elevated RBC, anemia, likely thalassemia pattern stable, no intervention necessary.    Remaining labs unremarkable.

## 2024-07-25 ENCOUNTER — HOSPITAL ENCOUNTER (OUTPATIENT)
Dept: RADIOLOGY | Facility: HOSPITAL | Age: 84
Discharge: HOME | End: 2024-07-25
Payer: MEDICARE

## 2024-07-25 ENCOUNTER — TELEPHONE (OUTPATIENT)
Dept: PRIMARY CARE | Facility: CLINIC | Age: 84
End: 2024-07-25
Payer: MEDICARE

## 2024-07-25 DIAGNOSIS — Z78.0 ASYMPTOMATIC MENOPAUSAL STATE: ICD-10-CM

## 2024-07-25 PROCEDURE — 77080 DXA BONE DENSITY AXIAL: CPT

## 2024-07-25 ASSESSMENT — LIFESTYLE VARIABLES
CURRENT_SMOKER: N
3_OR_MORE_DRINKS_PER_DAY: N

## 2024-07-25 NOTE — TELEPHONE ENCOUNTER
Result Communication    Resulted Orders   XR DEXA bone density    Narrative    Interpreted By:  Ivy Conte,   STUDY:  DEXA BONE DENSITY7/25/2024 2:25 pm      INDICATION:  Signs/Symptoms:Screen. The patient is a 84 y/o  year old F.  Postmenopausal with history of osteoporosis treatment      COMPARISON:  None.      ACCESSION NUMBER(S):  [id number]      ORDERING CLINICIAN:  CHRISTOPHER D'AMICO      TECHNIQUE:  DEXA BONE DENSITY      FINDINGS:  SPINE L1-L4  Bone Mineral Density: 1.499 g/cm2  T-Score 4.1  Z-Score 6.9  Bone Mineral Density change vs baseline:  Not reported  Bone Mineral Density change vs previous: Not reported      LEFT FEMUR -TOTAL  Bone Mineral Density: 0.976 g/cm2  T-Score 0.3   Z-Score  2.5  Bone Mineral Density change vs baseline: Not reported  Bone Mineral Density change vs previous: Not reported      LEFT FEMUR -NECK  Bone Mineral Density: 0.794 g/cm2  T-Score -0.5  Z-Score 1.9  Bone Mineral Density change vs baseline: Not reported  Bone Mineral Density change vs previous: Not reported          World Health Organization (WHO) criteria for post-menopausal,   Women:  Normal:         T-score at or above -1 SD  Osteopenia:   T-score between -1 and -2.5 SD  Osteoporosis: T-score at or below -2.5 SD          10-year Fracture Risk:  Major Osteoporotic Fracture  8.9 % without prior fracture and 13%  with prior fracture Hip Fracture                        1.5 % without  prior fracture and 2.0% with prior fracture      Note:  If no FRAX score is reported, it is because:  Some T-score for Spine Total or Hip Total or Femoral Neck at or below  -2.5      This exam was performed at [facility] on a [facility] Dexa Unit.        Impression    DEXA:  According to World Health Organization criteria,  classification is normal.      Followup recommended in 2 years or sooner as clinically warranted.      All images and detailed analysis are available on the  Radiology  PACS.       MACRO:  None      Signed by: Ivy Conte 7/25/2024 2:33 PM  Dictation workstation:   [id number]       3:54 PM      Results were successfully communicated with the patient and they acknowledged their understanding.

## 2024-07-25 NOTE — TELEPHONE ENCOUNTER
----- Message from Christopher D'Amico sent at 7/25/2024  3:41 PM EDT -----  Bone density was normal, with 2-year screenings

## 2024-07-31 ENCOUNTER — APPOINTMENT (OUTPATIENT)
Dept: RADIOLOGY | Facility: HOSPITAL | Age: 84
End: 2024-07-31
Payer: MEDICARE

## 2024-08-15 ENCOUNTER — APPOINTMENT (OUTPATIENT)
Dept: RADIOLOGY | Facility: HOSPITAL | Age: 84
End: 2024-08-15
Payer: MEDICARE

## 2024-08-15 ENCOUNTER — TELEPHONE (OUTPATIENT)
Dept: NEUROLOGY | Facility: CLINIC | Age: 84
End: 2024-08-15
Payer: MEDICARE

## 2024-08-15 DIAGNOSIS — G89.29 CHRONIC PAIN OF LEFT KNEE: Primary | ICD-10-CM

## 2024-08-15 DIAGNOSIS — M25.562 CHRONIC PAIN OF LEFT KNEE: Primary | ICD-10-CM

## 2024-08-23 ENCOUNTER — HOSPITAL ENCOUNTER (OUTPATIENT)
Dept: RADIOLOGY | Facility: CLINIC | Age: 84
Discharge: HOME | End: 2024-08-23
Payer: MEDICARE

## 2024-08-23 ENCOUNTER — LAB (OUTPATIENT)
Dept: LAB | Facility: LAB | Age: 84
End: 2024-08-23
Payer: MEDICARE

## 2024-08-23 DIAGNOSIS — G89.29 CHRONIC PAIN OF LEFT KNEE: ICD-10-CM

## 2024-08-23 DIAGNOSIS — E03.9 HYPOTHYROIDISM, UNSPECIFIED TYPE: ICD-10-CM

## 2024-08-23 DIAGNOSIS — M25.562 CHRONIC PAIN OF LEFT KNEE: ICD-10-CM

## 2024-08-23 PROCEDURE — 84443 ASSAY THYROID STIM HORMONE: CPT

## 2024-08-23 PROCEDURE — 84439 ASSAY OF FREE THYROXINE: CPT

## 2024-08-23 PROCEDURE — 73562 X-RAY EXAM OF KNEE 3: CPT | Mod: LT

## 2024-08-23 PROCEDURE — 36415 COLL VENOUS BLD VENIPUNCTURE: CPT

## 2024-08-24 LAB
T4 FREE SERPL-MCNC: 1.88 NG/DL (ref 0.78–1.48)
TSH SERPL-ACNC: 0.15 MIU/L (ref 0.44–3.98)

## 2024-08-26 DIAGNOSIS — E03.9 HYPOTHYROIDISM, UNSPECIFIED TYPE: Primary | ICD-10-CM

## 2024-08-26 RX ORDER — LEVOTHYROXINE SODIUM 88 UG/1
88 TABLET ORAL DAILY
Qty: 30 TABLET | Refills: 11 | Status: SHIPPED | OUTPATIENT
Start: 2024-08-26 | End: 2025-08-26

## 2024-08-28 ENCOUNTER — APPOINTMENT (OUTPATIENT)
Dept: RADIOLOGY | Facility: HOSPITAL | Age: 84
End: 2024-08-28
Payer: MEDICARE

## 2024-09-04 DIAGNOSIS — M25.562 ACUTE PAIN OF LEFT KNEE: Primary | ICD-10-CM

## 2024-09-04 DIAGNOSIS — F40.243 ANXIETY WITH FLYING: ICD-10-CM

## 2024-09-04 RX ORDER — PREDNISONE 20 MG/1
40 TABLET ORAL DAILY
Qty: 10 TABLET | Refills: 0 | Status: SHIPPED | OUTPATIENT
Start: 2024-09-04 | End: 2024-09-09

## 2024-09-04 RX ORDER — LORAZEPAM 1 MG/1
1-2 TABLET ORAL EVERY 8 HOURS PRN
Qty: 4 TABLET | Refills: 0 | Status: SHIPPED | OUTPATIENT
Start: 2024-09-04 | End: 2024-09-06

## 2024-09-05 DIAGNOSIS — E78.5 HYPERLIPIDEMIA, UNSPECIFIED HYPERLIPIDEMIA TYPE: Primary | ICD-10-CM

## 2024-09-05 RX ORDER — ROSUVASTATIN CALCIUM 40 MG/1
40 TABLET, COATED ORAL DAILY
Qty: 14 TABLET | Refills: 0 | Status: SHIPPED | OUTPATIENT
Start: 2024-09-05

## 2024-09-09 ENCOUNTER — HOSPITAL ENCOUNTER (OUTPATIENT)
Dept: RADIOLOGY | Facility: HOSPITAL | Age: 84
Discharge: HOME | End: 2024-09-09
Payer: MEDICARE

## 2024-09-09 DIAGNOSIS — E04.1 THYROID NODULE: ICD-10-CM

## 2024-09-09 PROCEDURE — 76536 US EXAM OF HEAD AND NECK: CPT | Performed by: RADIOLOGY

## 2024-09-09 PROCEDURE — 76536 US EXAM OF HEAD AND NECK: CPT

## 2024-09-11 NOTE — RESULT ENCOUNTER NOTE
Please call the patient regarding her thyroid ultrasound.  She has a new 1.6 cm right upper pole T RADS 4 nodule.  This should be biopsied.  Please call her and set her up for an in office ultrasound-guided FNA for September 25.

## 2024-09-17 DIAGNOSIS — E78.5 HYPERLIPIDEMIA, UNSPECIFIED HYPERLIPIDEMIA TYPE: Primary | ICD-10-CM

## 2024-09-17 RX ORDER — ROSUVASTATIN CALCIUM 40 MG/1
40 TABLET, COATED ORAL DAILY
Qty: 90 TABLET | Refills: 3 | Status: SHIPPED | OUTPATIENT
Start: 2024-09-17 | End: 2025-09-17

## 2024-09-25 ENCOUNTER — APPOINTMENT (OUTPATIENT)
Dept: OTOLARYNGOLOGY | Facility: CLINIC | Age: 84
End: 2024-09-25
Payer: MEDICARE

## 2024-09-25 DIAGNOSIS — E04.1 THYROID NODULE: Primary | ICD-10-CM

## 2024-09-25 PROCEDURE — 88112 CYTOPATH CELL ENHANCE TECH: CPT

## 2024-09-25 PROCEDURE — 88112 CYTOPATH CELL ENHANCE TECH: CPT | Performed by: PATHOLOGY

## 2024-09-25 PROCEDURE — 10005 FNA BX W/US GDN 1ST LES: CPT | Performed by: OTOLARYNGOLOGY

## 2024-09-25 NOTE — PROGRESS NOTES
She is here for ultrasound-guided FNA of a new right upper thyroid nodule     Date of Evaluation: 2024   HPI  Updated ultrasound 2024.  She has a new 1.6 cm right upper pole T RADS 4 nodule requiring biopsy.     Monica Hernandez is a 83 y.o. female follow-up on her thyroid. Her last thyroid ultrasound 2021. The nodules looked smaller. She feels well. TSH normal 2022 at 3.3  History:  for follow-up on her thyroid and parotid. She has a multinodular goiter with previous FNA showing category 2 nodule on the left with a 0 to 3% chance of malignancy. She also has a left parotid mass that was cystic and ruptured spontaneously. She has been doing well. 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     Coronary artery disease involving transplanted heart, angina presence unspecified, unspecified vessel or lesion type    Encounter for screening mammogram for malignant neoplasm of breast 2014    Visit for screening mammogram    Other conditions influencing health status     DEXA Body Composition Study    Other long term (current) drug therapy 2019    Chronic prescription benzodiazepine use    Personal history of diseases of the skin and subcutaneous tissue     History of psoriasis    Personal history of other diseases of the circulatory system     History of hypertension    Personal history of other diseases of the nervous system and sense organs     History of cataract    Unspecified secondary cataract     Secondary cataract      Past Surgical History:   Procedure Laterality Date     SECTION, CLASSIC  2013     Section    COLONOSCOPY  2013    Complete Colonoscopy    OTHER SURGICAL HISTORY  2019    Ganglion cyst excision    OTHER SURGICAL HISTORY  2019    Cataract surgery    OTHER SURGICAL HISTORY   11/01/2019    YAG laser capsulotomy    OTHER SURGICAL HISTORY  02/11/2016    Dilation Of Female Urethra    US GUIDED FINE PERCUTANEOUS ASPIRATION  7/28/2020    US GUIDED FINE PERCUTANEOUS ASPIRATION [facility]          Medications:   Current Outpatient Medications   Medication Instructions    alendronate (FOSAMAX) 70 mg, oral, Once Weekly    amLODIPine (NORVASC) 5 mg, oral, Daily    calcium carbonate-vitamin D3 (Calcium 600 with Vitamin D3) 600 mg-10 mcg (400 unit) chewable tablet 1 tablet, oral, 2 times daily    cholecalciferol (Vitamin D-3) 125 MCG (5000 UT) capsule 1 capsule, oral, Daily    cyanocobalamin, vitamin B-12, 2,500 mcg tablet, sublingual sublingual, Daily RT    escitalopram (LEXAPRO) 10 mg, oral, Daily    ezetimibe (ZETIA) 10 mg, oral, Daily    fluticasone (Flonase) 50 mcg/actuation nasal spray 2 sprays, Each Nostril, Daily PRN    hydrocortisone 2.5 % cream APPLY TWICE A DAY TO AFFECTED AREAS AS NEEDED    levothyroxine (SYNTHROID, LEVOXYL) 88 mcg, oral, Daily, Take on an empty stomach at the same time each day, either 30 to 60 minutes prior to breakfast    LORazepam (ATIVAN) 1-2 mg, oral, Every 8 hours PRN, Take 30 minutes prior to procedure.    losartan-hydrochlorothiazide (Hyzaar) 100-25 mg tablet 1 tablet, oral, Daily    multivit-iron-minerals-folic acid (Centrum Silver) 0.4 mg-300 mcg- 250 mcg tab 1 tablet, oral, Daily    OneTouch Ultra Test strip Daily RT    predniSONE (Deltasone) 20 mg tablet TAKE ONE TABLET BY MOUTH EVERY DAY FOR 5 DAYS    primidone (Mysoline) 50 mg tablet Take 2 1/2 tablets in the Morning, 2 1/2 tablets in the Afternoon, 3 tablets at nightTake 2 1/2 tablets in the Morning, 2 1/2 tablets in the Afternoon, 3 tablets at night    propranolol (INDERAL) 80 mg, oral, 3 times daily    rosuvastatin (CRESTOR) 40 mg, oral, Daily        Allergies:  Allergies   Allergen Reactions    Tetracyclines Unknown    Sulfamethoxazole-Trimethoprim Rash        Physical Exam:  Last Recorded  Vitals  There were no vitals taken for this visit.          Diagnoses and all orders for this visit:  Thyroid nodule (Primary)  Ultrasound-guided fine-needle aspiration of the thyroid procedure:  The patient presents for ultrasound-guided fine-needle aspiration of the thyroid.  Informed consent was obtained.  Ultrasound of the thyroid was carried out and images were captured.  The nodule(s) of interest biopsied was/were in the  Right upper lobe(s) of the thyroid 1.6 cm nodule  Under ultrasound-guidance, needle biopsy of the identified thyroid nodule was carried out with [] gauge needles.  A total of 4 passes were made under ultrasound guidance.  Specimens were sent for both cytology and Afirma testing.  The patient tolerated the procedure well and will apply ice to the thyroid for the next 24 hours.  I will call the patient in 1 week with biopsy results and the patient will call me if the patient does not hear from me within 1 week.  All questions were answered.    PLAN  I will call her with results    Mikel Marcano MD

## 2024-09-27 LAB
LABORATORY COMMENT REPORT: NORMAL
LABORATORY COMMENT REPORT: NORMAL
PATH REPORT.FINAL DX SPEC: NORMAL
PATH REPORT.GROSS SPEC: NORMAL
PATH REPORT.RELEVANT HX SPEC: NORMAL
PATH REPORT.TOTAL CANCER: NORMAL

## 2024-09-30 DIAGNOSIS — E04.1 THYROID NODULE: Primary | ICD-10-CM

## 2024-09-30 NOTE — RESULT ENCOUNTER NOTE
Biopsy was nondiagnostic.  We did not get enough cells out on her ultrasound-guided FNA.  Please have her see interventional radiology for a repeat biopsy

## 2024-10-04 ENCOUNTER — HOSPITAL ENCOUNTER (OUTPATIENT)
Dept: RADIOLOGY | Facility: HOSPITAL | Age: 84
Discharge: HOME | End: 2024-10-04
Payer: MEDICARE

## 2024-10-04 DIAGNOSIS — Z12.31 ENCOUNTER FOR SCREENING MAMMOGRAM FOR MALIGNANT NEOPLASM OF BREAST: ICD-10-CM

## 2024-10-04 PROCEDURE — 77067 SCR MAMMO BI INCL CAD: CPT

## 2024-10-09 NOTE — PROGRESS NOTES
Subjective      Chief Complaint   Patient presents with    Left Knee - Pain        Past Surgical History:   Procedure Laterality Date     SECTION, CLASSIC  2013     Section    COLONOSCOPY  2013    Complete Colonoscopy    OTHER SURGICAL HISTORY  2019    Ganglion cyst excision    OTHER SURGICAL HISTORY  2019    Cataract surgery    OTHER SURGICAL HISTORY  2019    YAG laser capsulotomy    OTHER SURGICAL HISTORY  2016    Dilation Of Female Urethra    US GUIDED FINE PERCUTANEOUS ASPIRATION  2020    US GUIDED FINE PERCUTANEOUS ASPIRATION [facility]        Past Medical History:   Diagnosis Date    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     Coronary artery disease involving transplanted heart, angina presence unspecified, unspecified vessel or lesion type    Encounter for screening mammogram for malignant neoplasm of breast 2014    Visit for screening mammogram    Other conditions influencing health status     DEXA Body Composition Study    Other long term (current) drug therapy 2019    Chronic prescription benzodiazepine use    Personal history of diseases of the skin and subcutaneous tissue     History of psoriasis    Personal history of other diseases of the circulatory system     History of hypertension    Personal history of other diseases of the nervous system and sense organs     History of cataract    Unspecified secondary cataract     Secondary cataract        HPI  This 83 year old patient presents today with left knee pain (8/10). The patient states that this left knee pain has been worsening and persistent for months. The patient denies trauma or injury to the left knee. The patient states that the left knee pain is worse with and aggravated by prolonged walking and standing. The patient states that this left knee pain impairs their ability to complete normal activities of daily living.  She is seen today ambulating with the use of a walker. The patient has tried Tylenol and ibuprofen with no relief.    Allergies   Allergen Reactions    Tetracyclines Unknown    Sulfamethoxazole-Trimethoprim Rash        No family history on file.     Social History     Socioeconomic History    Marital status:      Spouse name: Not on file    Number of children: Not on file    Years of education: Not on file    Highest education level: Not on file   Occupational History    Not on file   Tobacco Use    Smoking status: Former     Current packs/day: 0.00     Types: Cigarettes     Quit date:      Years since quittin.8     Passive exposure: Never    Smokeless tobacco: Never   Vaping Use    Vaping status: Never Used   Substance and Sexual Activity    Alcohol use: Never    Drug use: Never    Sexual activity: Defer   Other Topics Concern    Not on file   Social History Narrative    Not on file     Social Determinants of Health     Financial Resource Strain: Not on file   Food Insecurity: Not on file   Transportation Needs: Not on file   Physical Activity: Not on file   Stress: Not on file   Social Connections: Not on file   Intimate Partner Violence: Not on file   Housing Stability: Not on file        ROS  CARDIOLOGY:   Negative for chest pain, shortness of breath.   RESPIRATORY:   Negative for chest pain, shortness of breath.   MUSCULOSKELETAL:   See HPI for details.   NEUROLOGY:   Negative for tingling, numbness, weakness.    Objective    Body mass index is 38.34 kg/m².     Physical Exam  GENERAL:          General Appearance:  This is a pleasant patient with appropriate affect, in no acute distress.   DERMATOLOGY:          Skin: skin at the neck, upper and lower back, and trunk is intact. There is no evidence of skin rash, skin breakdown or ulceration, or atrophic skin change.   EXTREMITIES:          Vascular:  Right, left hands and feet are warm with good color and pulses. Right and left calf and thigh are  nontender and nonswollen.   NEUROLOGICAL:          Orientation:  Patient is alert and oriented to person, place, time and situation. Right and left upper and lower extremity motor and sensory examinations are intact.  MUSCULOSKELETAL: Neck: No tenderness. No pain or limitation with range of motion. Back: No tenderness. Straight leg test negative bilaterally. Right and left hips: No tenderness over the right or left greater trochanter. Right and left lower extremity in good position. Right knee: Nontender. No pain with gentle ROM. left knee: There is diffuse tenderness over the knee. The patient has ROM from 0-120 degrees. McMurrays is equivocal. Anterior drawer and lachmans are negative. There is not an effusion present. The left knee is stable to valgus and varus stressing. Nontender in the left calf. Compartments are soft. Neurovascular is intact to light touch. The patient walks with a painful gait favoring the left  knee while walking.    BI mammo bilateral screening tomosynthesis    Result Date: 10/9/2024  Interpreted By:  Adina Birmingham and Liller Gregory STUDY: BI MAMMO BILATERAL SCREENING TOMOSYNTHESIS;  10/4/2024 2:01 pm   ACCESSION NUMBER(S): [id number]   ORDERING CLINICIAN: CHRISTOPHER D'AMICO   INDICATION: Screening.   ,Z12.31 Encounter for screening mammogram for malignant neoplasm of breast   COMPARISON: Mammogram 08/14/2023, 02/10/2023, and 07/22/2022.   FINDINGS: 2D and tomosynthesis images were reviewed at 1 mm slice thickness. Best images possible.   Density:  There are scattered areas of fibroglandular density.   A stable benign mass is seen in the central lateral right breast at mid depth. No suspicious masses or calcifications are identified in either breast.       No mammographic evidence of malignancy.   BI-RADS CATEGORY: BI-RADS Category:  2 Benign. Recommendation:  Annual Screening. Recommended Date:  1 Year. Laterality:  Bilateral.       For any future breast imaging appointments, please  call [phone] ([phone]).   I personally reviewed the images/study and I agree with the findings as stated above by resident physician, Dr. Holland Hall.   MACRO: None   Signed by: Adina Birmingham 10/9/2024 2:21 PM Dictation workstation:   [id number]     Patient ID: Monica Hernandez is a 83 y.o. female.    L Inj/Asp: L knee on 10/10/2024 12:01 PM  Indications: pain  Details: 22 G needle, medial approach  Medications: 1 mL lidocaine 10 mg/mL (1 %); 10 mg triamcinolone acetonide 40 mg/mL  Outcome: tolerated well, no immediate complications  Procedure, treatment alternatives, risks and benefits explained, specific risks discussed. Immediately prior to procedure a time out was called to verify the correct patient, procedure, equipment, support staff and site/side marked as required. Patient was prepped and draped in the usual sterile fashion.           Monica was seen today for pain.  Diagnoses and all orders for this visit:  Chronic pain of left knee (Primary)  Chondrocalcinosis of left knee  Primary osteoarthritis of left knee  Other orders  -     L Inj/Asp  Options are discussed with the patient in detail. The patient is instructed regarding activity modification, ice, provider directed at home gentle strengthening and ROM exercises, and the appropriate use of Tylenol as needed for pain with its potential adverse reactions and side effects. The patient understands. The patient states that despite all the treatment listed above that this left knee pain is debilitating and  requests a discussion of further options. Cortisone injection to the left knee is discussed in the office today. This is done in the office today. See procedures below. Return as needed, Please note that this report has been produced using speech recognition software.  It may contain errors related to grammar, punctuation or spelling.  Electronically signed, but not reviewed.       Carlee Chao PA-C

## 2024-10-10 ENCOUNTER — LAB (OUTPATIENT)
Dept: LAB | Facility: LAB | Age: 84
End: 2024-10-10
Payer: MEDICARE

## 2024-10-10 ENCOUNTER — OFFICE VISIT (OUTPATIENT)
Dept: ORTHOPEDIC SURGERY | Facility: CLINIC | Age: 84
End: 2024-10-10
Payer: MEDICARE

## 2024-10-10 VITALS — BODY MASS INDEX: 38.46 KG/M2 | HEIGHT: 56 IN | WEIGHT: 171 LBS

## 2024-10-10 DIAGNOSIS — M25.562 CHRONIC PAIN OF LEFT KNEE: Primary | ICD-10-CM

## 2024-10-10 DIAGNOSIS — E03.9 HYPOTHYROIDISM, UNSPECIFIED TYPE: ICD-10-CM

## 2024-10-10 DIAGNOSIS — M17.12 PRIMARY OSTEOARTHRITIS OF LEFT KNEE: ICD-10-CM

## 2024-10-10 DIAGNOSIS — G89.29 CHRONIC PAIN OF LEFT KNEE: Primary | ICD-10-CM

## 2024-10-10 DIAGNOSIS — M11.262 CHONDROCALCINOSIS OF LEFT KNEE: ICD-10-CM

## 2024-10-10 PROCEDURE — 1159F MED LIST DOCD IN RCRD: CPT | Performed by: PHYSICIAN ASSISTANT

## 2024-10-10 PROCEDURE — 99213 OFFICE O/P EST LOW 20 MIN: CPT | Performed by: PHYSICIAN ASSISTANT

## 2024-10-10 PROCEDURE — 1036F TOBACCO NON-USER: CPT | Performed by: PHYSICIAN ASSISTANT

## 2024-10-10 PROCEDURE — 1125F AMNT PAIN NOTED PAIN PRSNT: CPT | Performed by: PHYSICIAN ASSISTANT

## 2024-10-10 PROCEDURE — 1160F RVW MEDS BY RX/DR IN RCRD: CPT | Performed by: PHYSICIAN ASSISTANT

## 2024-10-10 PROCEDURE — 2500000004 HC RX 250 GENERAL PHARMACY W/ HCPCS (ALT 636 FOR OP/ED): Performed by: PHYSICIAN ASSISTANT

## 2024-10-10 PROCEDURE — 1157F ADVNC CARE PLAN IN RCRD: CPT | Performed by: PHYSICIAN ASSISTANT

## 2024-10-10 PROCEDURE — 99203 OFFICE O/P NEW LOW 30 MIN: CPT | Performed by: PHYSICIAN ASSISTANT

## 2024-10-10 PROCEDURE — 20610 DRAIN/INJ JOINT/BURSA W/O US: CPT | Mod: LT | Performed by: PHYSICIAN ASSISTANT

## 2024-10-10 PROCEDURE — 84443 ASSAY THYROID STIM HORMONE: CPT

## 2024-10-10 PROCEDURE — 36415 COLL VENOUS BLD VENIPUNCTURE: CPT

## 2024-10-10 RX ORDER — TRIAMCINOLONE ACETONIDE 40 MG/ML
10 INJECTION, SUSPENSION INTRA-ARTICULAR; INTRAMUSCULAR
Status: COMPLETED | OUTPATIENT
Start: 2024-10-10 | End: 2024-10-10

## 2024-10-10 RX ORDER — LIDOCAINE HYDROCHLORIDE 10 MG/ML
1 INJECTION, SOLUTION INFILTRATION; PERINEURAL
Status: COMPLETED | OUTPATIENT
Start: 2024-10-10 | End: 2024-10-10

## 2024-10-10 ASSESSMENT — PAIN - FUNCTIONAL ASSESSMENT: PAIN_FUNCTIONAL_ASSESSMENT: 0-10

## 2024-10-10 ASSESSMENT — LIFESTYLE VARIABLES
HOW OFTEN DURING THE LAST YEAR HAVE YOU FOUND THAT YOU WERE NOT ABLE TO STOP DRINKING ONCE YOU HAD STARTED: NEVER
SKIP TO QUESTIONS 9-10: 1
AUDIT TOTAL SCORE: 0
HOW OFTEN DURING THE LAST YEAR HAVE YOU BEEN UNABLE TO REMEMBER WHAT HAPPENED THE NIGHT BEFORE BECAUSE YOU HAD BEEN DRINKING: NEVER
HOW MANY STANDARD DRINKS CONTAINING ALCOHOL DO YOU HAVE ON A TYPICAL DAY: PATIENT DOES NOT DRINK
HOW OFTEN DURING THE LAST YEAR HAVE YOU FAILED TO DO WHAT WAS NORMALLY EXPECTED FROM YOU BECAUSE OF DRINKING: NEVER
HOW OFTEN DO YOU HAVE A DRINK CONTAINING ALCOHOL: NEVER
HAS A RELATIVE, FRIEND, DOCTOR, OR ANOTHER HEALTH PROFESSIONAL EXPRESSED CONCERN ABOUT YOUR DRINKING OR SUGGESTED YOU CUT DOWN: NO
HOW OFTEN DURING THE LAST YEAR HAVE YOU NEEDED AN ALCOHOLIC DRINK FIRST THING IN THE MORNING TO GET YOURSELF GOING AFTER A NIGHT OF HEAVY DRINKING: NEVER
HAVE YOU OR SOMEONE ELSE BEEN INJURED AS A RESULT OF YOUR DRINKING: NO
HOW OFTEN DURING THE LAST YEAR HAVE YOU HAD A FEELING OF GUILT OR REMORSE AFTER DRINKING: NEVER
HOW OFTEN DO YOU HAVE SIX OR MORE DRINKS ON ONE OCCASION: NEVER
AUDIT-C TOTAL SCORE: 0

## 2024-10-10 ASSESSMENT — COLUMBIA-SUICIDE SEVERITY RATING SCALE - C-SSRS
6. HAVE YOU EVER DONE ANYTHING, STARTED TO DO ANYTHING, OR PREPARED TO DO ANYTHING TO END YOUR LIFE?: NO
2. HAVE YOU ACTUALLY HAD ANY THOUGHTS OF KILLING YOURSELF?: NO
1. IN THE PAST MONTH, HAVE YOU WISHED YOU WERE DEAD OR WISHED YOU COULD GO TO SLEEP AND NOT WAKE UP?: NO

## 2024-10-10 ASSESSMENT — ENCOUNTER SYMPTOMS
DEPRESSION: 0
OCCASIONAL FEELINGS OF UNSTEADINESS: 1
LOSS OF SENSATION IN FEET: 0

## 2024-10-10 ASSESSMENT — PAIN SCALES - GENERAL
PAINLEVEL_OUTOF10: 3
PAINLEVEL: 3

## 2024-10-10 ASSESSMENT — PAIN DESCRIPTION - DESCRIPTORS: DESCRIPTORS: DISCOMFORT;NAGGING

## 2024-10-11 LAB — TSH SERPL-ACNC: 1.52 MIU/L (ref 0.44–3.98)

## 2024-10-18 ENCOUNTER — TELEPHONE (OUTPATIENT)
Dept: ORTHOPEDIC SURGERY | Facility: CLINIC | Age: 84
End: 2024-10-18
Payer: MEDICARE

## 2024-10-18 NOTE — TELEPHONE ENCOUNTER
Patient called to set up her 3 month injection and wanted to let you know that at the last visit her cheeks were very red.

## 2024-10-21 NOTE — H&P
History Of Present Illness  Monica Hernandez is a 84 y.o. female presenting with thyroid nodules.  Thyroid ultrasound identified 1.6 cm right thyroid nodule. Attempt FNA in office which was non-diagnostic/ insufficient cells. She has a multinodular goiter with previous FNA showing category 2 nodule on the left with a 0 to 3% chance of malignancy. She also has a left parotid mass that was cystic and ruptured spontaneously.  PMH includes thyroid nodules, HTN, HLD, CAD, aortic valve stenosis, DM2, hypothyroid, anxiety, osteopenia, essential tremor   Here for thyroid biopsy    IMPRESSION:  1.6 cm right thyroid TI-RADS 4 nodule for which FNA is advised.    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     Coronary artery disease involving transplanted heart, angina presence unspecified, unspecified vessel or lesion type    Encounter for screening mammogram for malignant neoplasm of breast 2014    Visit for screening mammogram    Other conditions influencing health status     DEXA Body Composition Study    Other long term (current) drug therapy 2019    Chronic prescription benzodiazepine use    Personal history of diseases of the skin and subcutaneous tissue     History of psoriasis    Personal history of other diseases of the circulatory system     History of hypertension    Personal history of other diseases of the nervous system and sense organs     History of cataract    Unspecified secondary cataract     Secondary cataract        Past Surgical History:  Past Surgical History:   Procedure Laterality Date     SECTION, CLASSIC  2013     Section    COLONOSCOPY  2013    Complete Colonoscopy    OTHER SURGICAL HISTORY  2019    Ganglion cyst excision    OTHER SURGICAL HISTORY  2019    Cataract surgery    OTHER SURGICAL HISTORY  2019    YAG laser capsulotomy     OTHER SURGICAL HISTORY  02/11/2016    Dilation Of Female Urethra    US GUIDED FINE PERCUTANEOUS ASPIRATION  7/28/2020    US GUIDED FINE PERCUTANEOUS ASPIRATION [facility]          Social History:   reports that she quit smoking about 44 years ago. Her smoking use included cigarettes. She has never been exposed to tobacco smoke. She has never used smokeless tobacco. She reports that she does not drink alcohol and does not use drugs.     Family History:  No family history on file.     Allergies:  Allergies   Allergen Reactions    Tetracyclines Unknown    Sulfamethoxazole-Trimethoprim Rash        Home Medications:  Current Outpatient Medications   Medication Instructions    alendronate (FOSAMAX) 70 mg, oral, Once Weekly    amLODIPine (NORVASC) 5 mg, oral, Daily    amoxicillin (Amoxil) 500 mg tablet 1 tablet, 3 times daily (0900,1400,1900)    calcium carbonate-vitamin D3 (Calcium 600 with Vitamin D3) 600 mg-10 mcg (400 unit) chewable tablet 1 tablet, 2 times daily    cholecalciferol (Vitamin D-3) 125 MCG (5000 UT) capsule 1 capsule, Daily    cyanocobalamin, vitamin B-12, 2,500 mcg tablet, sublingual Daily RT    escitalopram (LEXAPRO) 10 mg, oral, Daily    ezetimibe (ZETIA) 10 mg, oral, Daily    fluticasone (Flonase) 50 mcg/actuation nasal spray 2 sprays, Daily PRN    hydrocortisone 2.5 % cream APPLY TWICE A DAY TO AFFECTED AREAS AS NEEDED    levothyroxine (SYNTHROID, LEVOXYL) 88 mcg, oral, Daily, Take on an empty stomach at the same time each day, either 30 to 60 minutes prior to breakfast    LORazepam (ATIVAN) 1-2 mg, oral, Every 8 hours PRN, Take 30 minutes prior to procedure.    losartan-hydrochlorothiazide (Hyzaar) 100-25 mg tablet 1 tablet, oral, Daily    multivit-iron-minerals-folic acid (Centrum Silver) 0.4 mg-300 mcg- 250 mcg tab 1 tablet, Daily    OneTouch Ultra Test strip Daily RT    predniSONE (Deltasone) 20 mg tablet TAKE ONE TABLET BY MOUTH EVERY DAY FOR 5 DAYS    primidone (Mysoline) 50 mg tablet  Take 2 1/2 tablets in the Morning, 2 1/2 tablets in the Afternoon, 3 tablets at nightTake 2 1/2 tablets in the Morning, 2 1/2 tablets in the Afternoon, 3 tablets at night    propranolol (INDERAL) 80 mg, oral, 3 times daily    rosuvastatin (CRESTOR) 40 mg, oral, Daily       Inpatient Medications:            Review of Systems   Constitutional: Negative.    HENT: Negative.     Eyes: Negative.    Respiratory: Negative.     Cardiovascular:  Positive for leg swelling.   Gastrointestinal: Negative.    Endocrine: Negative.    Genitourinary: Negative.    Musculoskeletal: Negative.    Skin: Negative.    Allergic/Immunologic: Negative.    Neurological: Negative.    Hematological: Negative.  Negative for adenopathy.   Psychiatric/Behavioral: Negative.            Physical Exam  Constitutional:       General: She is awake. She is not in acute distress.     Appearance: She is not ill-appearing.   HENT:      Head: Normocephalic and atraumatic.      Mouth/Throat:      Mouth: Mucous membranes are moist.      Pharynx: Oropharynx is clear.   Cardiovascular:      Rate and Rhythm: Normal rate and regular rhythm.      Pulses:           Radial pulses are 2+ on the right side and 2+ on the left side.        Dorsalis pedis pulses are 2+ on the right side and 2+ on the left side.      Heart sounds: Murmur heard.   Pulmonary:      Effort: Pulmonary effort is normal.      Breath sounds: Normal breath sounds.   Abdominal:      General: Bowel sounds are normal.      Palpations: Abdomen is soft.   Musculoskeletal:      Cervical back: Normal range of motion and neck supple.      Right lower leg: Edema present.      Left lower leg: Edema present.      Comments: Trace lower ext edema   Lymphadenopathy:      Cervical: No cervical adenopathy.   Skin:     General: Skin is warm and dry.      Capillary Refill: Capillary refill takes less than 2 seconds.   Neurological:      General: No focal deficit present.      Mental Status: She is alert and oriented  to person, place, and time. Mental status is at baseline.      Cranial Nerves: Cranial nerves 2-12 are intact.   Psychiatric:         Mood and Affect: Mood and affect normal.         Behavior: Behavior normal.        Sedation Plan    ASA 3     Mallampati class: III.           NPO since 1730 on 10/23/2024    Last Recorded Vitals  Blood pressure (!) 189/87, pulse 80, temperature 37.4 °C (99.3 °F), temperature source Temporal, resp. rate 16.         Vitals from the Past 24 Hours  Heart Rate:  [80]   Temp:  [37.4 °C (99.3 °F)]   Resp:  [16]   BP: (189)/(87)          Relevant Results    Labs    CBC:   Recent Labs     10/24/24  1030 07/24/24  0953 01/26/24  1347 12/15/22  1025 06/25/21  0925 03/02/21  0845   WBC 6.4 5.4 6.1 5.9 4.8 5.6   HGB 13.3 12.5 12.8 12.5 12.3 12.4   HCT 43.0 41.5 42.7 42.1 41.8 42.9    305 262 307 269 283   MCV 72* 72* 75* 73* 76* 77*     BMP/CMP:   Recent Labs     07/24/24  0953 12/07/23  0916 12/15/22  1025 06/14/22  0950 11/24/21  0859 06/25/21  0925    142 142 145 142 142   K 4.0 4.0 4.3 4.3 4.2 3.7    101 99 103 102 102   BUN 21 15 14 20 18 17   CREATININE 0.63 0.76 0.67 0.71 0.68 0.70   CO2 29 34* 32 35* 33* 33*   CALCIUM 9.9 10.5 10.3 10.0 9.9 9.8   PROT 7.0 7.4 7.1 7.2 7.1 6.8   BILITOT 0.4 0.4 0.4 0.3 0.4 0.3   ALKPHOS 64 77 72 77 87 82   ALT 17 24 25 29 17 19   AST 15 20 20 21 16 17   GLUCOSE 103* 101* 116* 114* 117* 122*      Lipid Panel:   Recent Labs     07/24/24  0953 12/07/23  0916 12/15/22  1025 06/14/22  0950 06/25/21  0925 03/02/21  0845 10/29/20  0910 07/06/20  0943 03/06/20  0911 10/31/19  0919 07/02/19  1251 03/05/19  1051   CHOL 146 159 170 143 181 178 178 180 233* 179 190 197   HDL 57.8 66.5 64.4 54.2 55.1 49.8 58.6 61.6 64.2 61.7 60.1 58.4   CHHDL 2.5 2.4 2.6 2.6 3.3 3.6 3.0 2.9 3.6 2.9 3.2 3.4   VLDL 25 25 32 27 32 39 44* 35 35 25 37 31   TRIG 123 126 160* 137 158* 195* 218* 175* 175* 127 186* 153*   NHDL 88 93  --   --   --   --  119  --   --   --   --    "--      Cardiac       No lab exists for component: \"CK\", \"CKMBP\"   Hemoglobin A1C:   Recent Labs     07/24/24  0953 04/30/24  1414 12/07/23  0916 12/15/22  1025 11/24/21  0859 06/25/21  0925 03/02/21  0845 10/29/20  0910 07/06/20  0943 03/06/20  0911 10/31/19  0919 07/02/19  1251   HGBA1C 5.6 5.7* 5.5 5.8* 6.0* 6.1 6.3 5.9 5.7 5.8 5.6 5.7     TSH/ Free T4:   Recent Labs     10/10/24  1239 08/23/24  1337 07/24/24  0953 12/15/22  1025 11/24/21  0859 10/29/20  0910 03/06/20  0911 03/05/19  1051 10/30/18  0920 03/13/18  1004   TSH 1.52 0.15* 0.03* 3.28 3.09 3.09 2.97 2.14 2.17 1.53   FREET4  --  1.88* 2.98*  --   --   --   --   --   --   --          STUDY:  US THYROID;  9/9/2024 1:46 pm      INDICATION:  Signs/Symptoms:multinodular goiter.      COMPARISON:  Thyroid ultrasound 08/16/2021      ACCESSION NUMBER(S):  [id number]      ORDERING CLINICIAN:  DEWAYNE PADRON      TECHNIQUE:  Multiple ultrasonographic images of the thyroid gland were obtained.      FINDINGS:  PARENCHYMA:      RIGHT LOBE:  The right lobe of the thyroid measures 1.3 cm x 1.6 cm x 4.5 cm.      NODULES:  1.6 cm upper pole nodule which appears predominantly solid and  hypoechoic, compatible with a TI-RADS 4 nodule. This previously  measured up to 0.5 cm.      LEFT LOBE:  The left lobe of the thyroid measures 1.3 cm x 1.4 cm x 4.2 cm.      NODULES:  Spongiform nodule measuring up to 2.3 cm compatible with a TIRADS 1  nodule, previously measuring 2.1 cm.      ISTHMUS:  The isthmus measures approximately 0.3 cm .      (Please note, assessment and description of nodules is per TI-RADS  criteria. Up to 4 total nodules described, which includes largest  and/or most clinically significant based on morphology.) It is noted  that some spongiform and/or cystic nodules may not be specifically  described and are TR category 1 (benign).      CERVICAL LYMPH NODES:  No enlarged or morphologically abnormal cervical nodes are seen.      IMPRESSION:  1.6 cm right thyroid " "TI-RADS 4 nodule for which FNA is advised.      MACRO:  None      Past Cardiology Tests (Last 3 Years):    EKG:  No results for input(s): \"ATRRATE\", \"VENTRATE\", \"PRINT\", \"QRSDUR\", \"QTCFRED\", \"QTCCALCB\" in the last 07760 hours.No results found for this or any previous visit (from the past 4464 hours).  Echo:  Echocardiogram:   Echocardiogram     Narrative  [facility], [address] [place] [address]  Tel [phone] and Fax [phone]    TRANSTHORACIC ECHOCARDIOGRAM REPORT      Patient Name:     LISA MICHEL Reading Physician:   [id number] Paul Edward MD  Study Date:       6/6/2023         Referring Physician: PAUL EDWARD  MRN/PID:          [id number]         PCP:  Accession/Order#: [id number]     Department Location: [facility]  YOB: 1940       Fellow:  Gender:           F                Nurse:  Admit Date:                        Sonographer:         Hannah Vasquez [facility]  Admission Status: Outpatient       Additional Staff:  Height:           142.24 cm        CC Report to:  Weight:           75.75 kg         Study Type:          Echocardiogram  BSA:              1.64 m2  Blood Pressure: 188 /187 mmHg    Diagnosis/ICD: I35.0-Nonrheumatic aortic (valve) stenosis  Indication:    AS  Procedure/CPT: Echo Complete w Full [id number]    Patient History:  Pertinent History: H/o AS, HTN, DM, murmur, hyperlipidemia.    Study Detail: The following Echo studies were performed: 2D, M-Mode, Doppler and  color flow. Technically challenging study due to body habitus.      PHYSICIAN INTERPRETATION:  Left Ventricle: The left ventricular systolic function is normal, with an estimated ejection fraction of 60-65%. The calculated ejection fraction is normal at 69 % using the Godinez's Bi-plane MOD calculation. There are no regional wall motion abnormalities. The left ventricular cavity size is normal. There is mild concentric left ventricular hypertrophy. Spectral Doppler shows an " impaired relaxation pattern of left ventricular diastolic filling.  Left Atrium: The left atrium is mildly dilated.  Right Ventricle: The right ventricle is normal in size. There is normal right ventriclar wall thickness. There is normal right ventricular global systolic function.  Right Atrium: The right atrium is normal in size.  Aortic Valve: The aortic valve is trileaflet. The aortic valve appears tricuspid with restriction. There is mild to moderate aortic valve cusp calcification. There is There is reduced systolic aortic valve leaflet excursion. There is evidence of moderate to severe aortic valve stenosis.  There is mild aortic valve regurgitation. The peak instantaneous gradient of the aortic valve is 55.3 mmHg. The mean gradient of the aortic valve is 23.9 mmHg.  Mitral Valve: The mitral valve is normal in structure. There is normal mitral valve leaflet mobility. There is mild to moderate mitral valve regurgitation.  Tricuspid Valve: The tricuspid valve is structurally normal. There is normal tricuspid valve leaflet mobility. There is trace tricuspid regurgitation. The Doppler estimated RVSP is moderately elevated at 50.7 mmHg.  Pulmonic Valve: The pulmonic valve is structurally normal. There is trace to mild pulmonic valve regurgitation.  Pericardium: There is no pericardial effusion noted. There is a pericardial fat pad present.  Aorta: The aortic root is normal. The Asc Ao is 2.90 cm. The thoracic aorta diam is 2.2 cm. There is no dilatation of the aortic arch. There is no dilatation of the ascending aorta. There is no dilatation of the aortic root.  Pulmonary Artery: The pulmonary artery is normal in size. The tricuspid regurgitant velocity is 3.45 m/s, and with an estimated right atrial pressure of 3 mmHg, the estimated pulmonary artery pressure is moderately elevated with the RVSP at 50.7 mmHg. The estimated PASP is 51 mmHg.  Systemic Veins: The inferior vena cava appears to be of normal size. There  is IVC inspiratory collapse greater than 50%.  In comparison to the previous echocardiogram(s): Compared with study from 11/3/2020,.      CONCLUSIONS:  1. Left ventricular systolic function is normal with a 60-65% estimated ejection fraction.  2. Spectral Doppler shows an impaired relaxation pattern of left ventricular diastolic filling.  3. Mild to moderate mitral valve regurgitation.  4. Moderately elevated right ventricular systolic pressure.  5. Moderate to severe aortic valve stenosis.  6. The aortic valve appears tricuspid with restriction.  7. Mild aortic valve regurgitation.  8. Moderately elevated pulmonary artery pressure.  9. The estimated PASP is 51 mmHg.  10. The peak instantaneous gradient of the aortic valve is 55.3 mmHg.    QUANTITATIVE DATA SUMMARY:  2D MEASUREMENTS:  Normal Ranges:  Ao Root d:     2.20 cm    (2.0-3.7cm)  LAs:           4.65 cm    (2.7-4.0cm)  IVSd:          1.01 cm    (0.6-1.1cm)  LVPWd:         1.11 cm    (0.6-1.1cm)  LVIDd:         4.92 cm    (3.9-5.9cm)  LVIDs:         3.49 cm  LV Mass Index: 116.0 g/m2  LV % FS        29.1 %    LA VOLUME:  Normal Ranges:  LA Vol A4C:        63.1 ml    (22+/-6mL/m2)  LA Vol A2C:        50.1 ml  LA Vol BP:         57.8 ml  LA Vol Index A4C:  38.3 ml/m2  LA Vol Index A2C:  30.4 ml/m2  LA Vol Index BP:   35.1 ml/m2  LA Area A4C:       20.2 cm2  LA Area A2C:       17.5 cm2  LA Major Axis A4C: 5.5 cm  LA Major Axis A2C: 5.2 cm  LA Volume Index:   35.0 ml/m2  LA Vol A4C:        57.0 ml  LA Vol A2C:        47.3 ml    RA VOLUME BY A/L METHOD:  Normal Ranges:  RA Vol A4C:        24.0 ml    (8.3-19.5ml)  RA Vol Index A4C:  14.6 ml/m2  RA Area A4C:       12.0 cm2  RA Major Axis A4C: 5.1 cm    M-MODE MEASUREMENTS:  Normal Ranges:  Ao Root: 2.60 cm (2.0-3.7cm)  LAs:     4.65 cm (2.7-4.0cm)    AORTA MEASUREMENTS:  Normal Ranges:  Asc Ao, d: 2.90 cm (2.1-3.4cm)  Ao Arch:   2.20 cm (2.0-3.6cm)    LV SYSTOLIC FUNCTION BY 2D PLANIMETRY (MOD):  Normal  Ranges:  EF-A4C View: 64.4 % (>=55%)  EF-A2C View: 72.7 %  EF-Biplane:  68.6 %    LV DIASTOLIC FUNCTION:  Normal Ranges:  MV Peak E:        0.91 m/s    (0.7-1.2 m/s)  MV Peak A:        0.93 m/s    (0.42-0.7 m/s)  E/A Ratio:        0.98        (1.0-2.2)  MV e'             0.08 m/s    (>8.0)  MV lateral e'     0.09 m/s  MV medial e'      0.08 m/s  MV A Dur:         114.18 msec  E/e' Ratio:       10.66       (<8.0)  a'                0.08 m/s  PulmV Sys Raghav:    46.16 cm/s  PulmV Garner Raghav:   29.60 cm/s  PulmV S/D Raghav:    1.56  PulmV A Revs Raghav: 30.84 cm/s  PulmV A Revs Dur: 116.08 msec    MITRAL VALVE:  Normal Ranges:  MV Vmax:    1.17 m/s (<=1.3m/s)  MV peak P.4 mmHg (<5mmHg)  MV mean P.3 mmHg (<2mmHg)  MV VTI:     32.56 cm (10-13cm)  MV DT:      160 msec (150-240msec)  MV PHT:     97 msec  (30-60msec)  MVA by PHT: 2.27 cm2 (4-6cm2)    MITRAL INSUFFICIENCY:  Normal Ranges:  MR VTI:  230.03 cm  MR Vmax: 591.74 cm/s    AORTIC VALVE:  Normal Ranges:  AoV Vmax:                3.72 m/s  (<=1.7m/s)  AoV Peak P.3 mmHg (<20mmHg)  AoV Mean P.9 mmHg (1.7-11.5mmHg)  LVOT Max Raghav:            0.91 m/s  (<=1.1m/s)  AoV VTI:                 81.94 cm  (18-25cm)  LVOT VTI:                23.79 cm  LVOT Diameter:           1.94 cm   (1.8-2.4cm)  AoV Area, VTI:           0.86 cm2  (2.5-5.5cm2)  AoV Area,Vmax:           0.72 cm2  (2.5-4.5cm2)  AoV Area, planim:        0.65 cm2  (2.5-4.5cm2)  AoV Dimensionless Index: 0.29    AORTIC INSUFFICIENCY:  AI Vmax:       3.14 m/s  AI Half-time:  452 msec  AI Decel Time: 1558 msec  AI Decel Rate: 209.21 cm/s2    RIGHT VENTRICLE:  RV 1   2.8 cm  RV 2   2.4 cm  RV 3   7.1 cm  TAPSE: 17.3 mm  RV s'  0.12 m/s    TRICUSPID VALVE/RVSP:  Normal Ranges:  Peak TR Velocity: 3.45 m/s  RV Syst Pressure: 50.7 mmHg (< 30mmHg)  IVC Diam:         1.30 cm    PULMONIC VALVE:  Normal Ranges:  PV Accel Time: 133 msec (>120ms)  PV Max Raghav:    0.8 m/s  (0.6-0.9m/s)  PV Max PG:     " 2.6 mmHg    Pulmonary Veins:  PulmV A Revs Dur: 116.08 msec  PulmV A Revs Raghav: 30.84 cm/s  PulmV Garner Raghav:   29.60 cm/s  PulmV S/D Raghav:    1.56  PulmV Sys Raghav:    46.16 cm/s      [id number] Paul Edward MD  Electronically signed on 6/8/2023 at 9:27:35 PM      Ejection Fractions:  No results found for: \"EF\"  Cath:  Coronary Angiography: No results found for this or any previous visit from the past 1800 days.    Right Heart Cath: No results found for this or any previous visit from the past 1800 days.    Stress Test:  Nuclear:  NM CARDIAC STRESS REST (MYOCARDIAL PERFUSION MIBI) 12/15/2021    Narrative  MRN: [id number]  Patient Name: LISA MICHEL    STUDY:  CARDIAC STRESS/REST INJECTION;  12/15/2021 11:15 am    INDICATION:  cad.    COMPARISON:  None.  SPECT myocardial perfusion study January 23, 2015    ACCESSION NUMBER(S):  [id number]    ORDERING CLINICIAN:  MATEO PARADA    TECHNIQUE:  DIVISION OF NUCLEAR MEDICINE  PHARMACOLOGIC STRESS MYOCARDIAL PERFUSION SCAN, ONE DAY PROTOCOL    The patient received an intravenous dose of  10.2 mCi of Tc-99m  Myoview and resting emission tomographic (SPECT) images of the  myocardium were acquired. The patient then received an intravenous  infusion of 0.4mg regadenoson (Lexiscan)  followed by an additional  dose of  29.3 mCi of Tc-99m  Myoview. Stress phase SPECT images of  the myocardium were then acquired. These included ECG-gated images to  assess and quantify ventricular function.  A low-dose, nondiagnostic  regional CT was utilized for attenuation correction purposes.    FINDINGS:  Both stress and rest studies demonstrate grossly normal perfusion  throughout the left ventricle.    The left ventricle is normal in size.    Gated images demonstrate normal LV wall motion with an LV EF  estimated at greater than 63% at both rest and post stress.    Attenuation correction CT images demonstrate a rounded low-density  fluid attenuation structure measuring 4.5 cm x 3.7 cm and most " likely  most likely reflecting a simple hepatic cyst.    Impression  1.  Normal myocardial perfusion study without evidence of ischemia or  prior infarction.  2. The left ventricle is normal in size.  3. Normal LV wall motion with an LV EF estimated at greater than 63%.      I personally reviewed the images/study and I agree with the findings  as stated. This study was interpreted at [facility], [place], [place].    Metabolic Stress: No results found for this or any previous visit from the past 1800 days.    Cardiac Imaging:  Cardiac Scoring: No results found for this or any previous visit from the past 1800 days.    Cardiac MRI: No results found for this or any previous visit from the past 1800 days.         Assessment/Plan  Assessment/Plan   Active Problems:  There are no active Hospital Problems.      Thyroid nodule    Here for ultrasound guided thyroid biopsy with Dr. Espinoza on 10/24/2024.                 I spent 35 minutes in the professional and overall care of this patient.      Ana Pablo, APRN-CNP

## 2024-10-24 ENCOUNTER — HOSPITAL ENCOUNTER (OUTPATIENT)
Dept: RADIOLOGY | Facility: HOSPITAL | Age: 84
Discharge: HOME | End: 2024-10-24
Payer: MEDICARE

## 2024-10-24 VITALS
HEART RATE: 65 BPM | TEMPERATURE: 99.3 F | OXYGEN SATURATION: 96 % | DIASTOLIC BLOOD PRESSURE: 68 MMHG | SYSTOLIC BLOOD PRESSURE: 159 MMHG | RESPIRATION RATE: 18 BRPM

## 2024-10-24 DIAGNOSIS — E04.1 THYROID NODULE: ICD-10-CM

## 2024-10-24 LAB
ERYTHROCYTE [DISTWIDTH] IN BLOOD BY AUTOMATED COUNT: 16.6 % (ref 11.5–14.5)
HCT VFR BLD AUTO: 43 % (ref 36–46)
HGB BLD-MCNC: 13.3 G/DL (ref 12–16)
INR PPP: 1 (ref 0.9–1.2)
MCH RBC QN AUTO: 22.3 PG (ref 26–34)
MCHC RBC AUTO-ENTMCNC: 30.9 G/DL (ref 32–36)
MCV RBC AUTO: 72 FL (ref 80–100)
NRBC BLD-RTO: 0 /100 WBCS (ref 0–0)
PLATELET # BLD AUTO: 286 X10*3/UL (ref 150–450)
PROTHROMBIN TIME: 10.1 SECONDS (ref 9.3–12.7)
RBC # BLD AUTO: 5.96 X10*6/UL (ref 4–5.2)
WBC # BLD AUTO: 6.4 X10*3/UL (ref 4.4–11.3)

## 2024-10-24 PROCEDURE — 36415 COLL VENOUS BLD VENIPUNCTURE: CPT | Performed by: NURSE PRACTITIONER

## 2024-10-24 PROCEDURE — 85610 PROTHROMBIN TIME: CPT | Performed by: NURSE PRACTITIONER

## 2024-10-24 PROCEDURE — 7100000010 HC PHASE TWO TIME - EACH INCREMENTAL 1 MINUTE

## 2024-10-24 PROCEDURE — 2500000004 HC RX 250 GENERAL PHARMACY W/ HCPCS (ALT 636 FOR OP/ED): Performed by: STUDENT IN AN ORGANIZED HEALTH CARE EDUCATION/TRAINING PROGRAM

## 2024-10-24 PROCEDURE — 76942 ECHO GUIDE FOR BIOPSY: CPT

## 2024-10-24 PROCEDURE — 7100000009 HC PHASE TWO TIME - INITIAL BASE CHARGE

## 2024-10-24 PROCEDURE — 85027 COMPLETE CBC AUTOMATED: CPT | Performed by: NURSE PRACTITIONER

## 2024-10-24 RX ORDER — AMOXICILLIN 500 MG/1
1 TABLET, FILM COATED ORAL
COMMUNITY
Start: 2024-10-21

## 2024-10-24 RX ORDER — LIDOCAINE HYDROCHLORIDE 10 MG/ML
INJECTION, SOLUTION EPIDURAL; INFILTRATION; INTRACAUDAL; PERINEURAL
Status: COMPLETED | OUTPATIENT
Start: 2024-10-24 | End: 2024-10-24

## 2024-10-24 ASSESSMENT — ENCOUNTER SYMPTOMS
RESPIRATORY NEGATIVE: 1
CONSTITUTIONAL NEGATIVE: 1
EYES NEGATIVE: 1
ADENOPATHY: 0
PSYCHIATRIC NEGATIVE: 1
GASTROINTESTINAL NEGATIVE: 1
ALLERGIC/IMMUNOLOGIC NEGATIVE: 1
ENDOCRINE NEGATIVE: 1
MUSCULOSKELETAL NEGATIVE: 1
NEUROLOGICAL NEGATIVE: 1
HEMATOLOGIC/LYMPHATIC NEGATIVE: 1

## 2024-10-24 ASSESSMENT — PAIN SCALES - GENERAL
PAINLEVEL_OUTOF10: 0 - NO PAIN

## 2024-10-24 ASSESSMENT — COLUMBIA-SUICIDE SEVERITY RATING SCALE - C-SSRS
6. HAVE YOU EVER DONE ANYTHING, STARTED TO DO ANYTHING, OR PREPARED TO DO ANYTHING TO END YOUR LIFE?: NO
1. IN THE PAST MONTH, HAVE YOU WISHED YOU WERE DEAD OR WISHED YOU COULD GO TO SLEEP AND NOT WAKE UP?: NO
2. HAVE YOU ACTUALLY HAD ANY THOUGHTS OF KILLING YOURSELF?: NO

## 2024-10-24 ASSESSMENT — PAIN - FUNCTIONAL ASSESSMENT: PAIN_FUNCTIONAL_ASSESSMENT: 0-10

## 2024-10-24 NOTE — DISCHARGE INSTRUCTIONS
Patient and Family Education  If you have questions or need to change the time or date of your CT Ultrasound, call   Radiology Scheduling at [phone].  What is a Biopsy or Aspiration?  A biopsy or aspiration is used to help doctors diagnose disease. Small pieces of tissue or cells  are taken from the area using a special kind of needle. The tissue is sent to the lab to be looked  at under a microscope. The procedure can take up to 1 hour.  Before the Test:  ? If you take blood thinning medications, you Must ask your doctor when you should stop  taking them. You may need to stop taking the medicine up to seven (7) days prior to the  test.  ? Do Not Drink or Eat Anything after midnight the day before the test. You may take  medications with a small amount of clear liquid.  ? If you take daily oral diabetic medications, contact your Radiologist or your Radiology  Nurse to determine if you should take your medicine before the test or adjust the dosage.  ? Make plans for a ride home if you are an outpatient.  ? If you have an allergy to iodine or iodinated contrast, your doctor may prescribe special  pills to take before the test.  During the Test:  ? You will lie on a padded X-Ray table.  ? You may be given medicine that will make you drowsy.  ? You will also be given a local anesthetic to numb the biopsy site.  ? You will feel pressure during the biopsy.  After the Test:  ? You will remain on bed rest for 1 to 4 hours.  ? Your blood pressure, pulse and biopsy site will be checked often.  ? If a large sample of tissue needs to be taken, you may be admitted to the hospital for  observation.  Following these instructions for a safer recovery:     Page 2 of 2  Activity:  ? Limit your activity for 24 hours after the test.  ? Do not drive for 24 hours.  ? Do not do any heavy lifting, such as groceries, for 24 hours.  ? Avoid intense exercise and contact sports for 24 hours.  Diet:  ? You may resume your normal  diet.  Medicines:  ? If you take medications to thin your blood, ask your doctor when you should start taking  them after the test.  ? You may take your other medicines as ordered by your doctor.  Call your Doctor if you have:  ? Redness, swelling or pus-like drainage at the biopsy site.  ? Temperature of 100.4 F degrees or higher.  ? Increased pain or tenderness at the biopsy site.  ? Any questions.  Call your Doctor Right Away if you have any of the Signs:  ? Increase in pain in the biopsy site.  ? Dizziness or fainting.  ? Shortness of breath or trouble breathing.  ? Bleeding from the biopsy site.  If you are not able to contact your doctor, call 911 and/or go to the nearest hospital.     How to Reach your Doctor:  Call  ____________________________at __________________ with problems or questions.

## 2024-10-24 NOTE — Clinical Note
4 passes done and dr placed them in cytolyte and afirma. Pt tolerated procedure well.  Bandaid to left neck.  Pt denies any pain to the left neck area.

## 2024-10-24 NOTE — Clinical Note
Reminded  that this is a repeat thyroid bx. Told the dr Espinoza that we always call pathology tech and the pathologist to look at cells.   states he does not want to use pathology tech or the pathologist.  He states he will send the cells in cytolyte.

## 2024-10-28 LAB
LABORATORY COMMENT REPORT: NORMAL
LABORATORY COMMENT REPORT: NORMAL
PATH REPORT.COMMENTS IMP SPEC: NORMAL
PATH REPORT.FINAL DX SPEC: NORMAL
PATH REPORT.GROSS SPEC: NORMAL
PATH REPORT.TOTAL CANCER: NORMAL

## 2024-10-28 NOTE — RESULT ENCOUNTER NOTE
Please call the patient regarding her thyroid FNA.  Biopsy returned benign follicular nodule of the left inferior nodule.  This is all good news.  Recheck ultrasound in 1 year

## 2024-11-22 DIAGNOSIS — M85.80 OSTEOPENIA, UNSPECIFIED LOCATION: ICD-10-CM

## 2024-11-25 RX ORDER — ALENDRONATE SODIUM 70 MG/1
70 TABLET ORAL
Qty: 12 TABLET | Refills: 0 | Status: SHIPPED | OUTPATIENT
Start: 2024-12-01

## 2024-11-29 DIAGNOSIS — G25.0 ESSENTIAL TREMOR: ICD-10-CM

## 2024-11-29 RX ORDER — PRIMIDONE 50 MG/1
TABLET ORAL
Qty: 720 TABLET | Refills: 0 | Status: SHIPPED | OUTPATIENT
Start: 2024-11-29

## 2024-12-09 RX ORDER — HYDROCODONE BITARTRATE AND ACETAMINOPHEN 5; 325 MG/1; MG/1
1 TABLET ORAL EVERY 4 HOURS PRN
COMMUNITY
Start: 2024-11-07

## 2024-12-10 ENCOUNTER — APPOINTMENT (OUTPATIENT)
Dept: PRIMARY CARE | Facility: CLINIC | Age: 84
End: 2024-12-10
Payer: MEDICARE

## 2024-12-10 ENCOUNTER — HOSPITAL ENCOUNTER (OUTPATIENT)
Dept: CARDIOLOGY | Facility: CLINIC | Age: 84
Discharge: HOME | End: 2024-12-10
Payer: MEDICARE

## 2024-12-10 ENCOUNTER — APPOINTMENT (OUTPATIENT)
Dept: CARDIOLOGY | Facility: CLINIC | Age: 84
End: 2024-12-10
Payer: MEDICARE

## 2024-12-10 ENCOUNTER — OFFICE VISIT (OUTPATIENT)
Dept: CARDIOLOGY | Facility: CLINIC | Age: 84
End: 2024-12-10
Payer: MEDICARE

## 2024-12-10 VITALS
HEIGHT: 56 IN | HEART RATE: 67 BPM | WEIGHT: 175 LBS | BODY MASS INDEX: 39.37 KG/M2 | SYSTOLIC BLOOD PRESSURE: 168 MMHG | DIASTOLIC BLOOD PRESSURE: 72 MMHG | OXYGEN SATURATION: 97 %

## 2024-12-10 VITALS
OXYGEN SATURATION: 95 % | DIASTOLIC BLOOD PRESSURE: 80 MMHG | HEIGHT: 56 IN | WEIGHT: 173.8 LBS | HEART RATE: 74 BPM | SYSTOLIC BLOOD PRESSURE: 174 MMHG | BODY MASS INDEX: 39.09 KG/M2

## 2024-12-10 DIAGNOSIS — E11.9 TYPE 2 DIABETES MELLITUS WITHOUT COMPLICATION, WITHOUT LONG-TERM CURRENT USE OF INSULIN (MULTI): ICD-10-CM

## 2024-12-10 DIAGNOSIS — E78.2 MIXED HYPERLIPIDEMIA: ICD-10-CM

## 2024-12-10 DIAGNOSIS — F41.9 ANXIETY: ICD-10-CM

## 2024-12-10 DIAGNOSIS — I35.0 AORTIC VALVE STENOSIS, ETIOLOGY OF CARDIAC VALVE DISEASE UNSPECIFIED: ICD-10-CM

## 2024-12-10 DIAGNOSIS — G25.0 ESSENTIAL TREMOR: ICD-10-CM

## 2024-12-10 DIAGNOSIS — M85.80 OSTEOPENIA, UNSPECIFIED LOCATION: ICD-10-CM

## 2024-12-10 DIAGNOSIS — I25.118 CORONARY ARTERY DISEASE OF NATIVE ARTERY OF NATIVE HEART WITH STABLE ANGINA PECTORIS: ICD-10-CM

## 2024-12-10 DIAGNOSIS — I10 BENIGN ESSENTIAL HYPERTENSION: ICD-10-CM

## 2024-12-10 DIAGNOSIS — I25.118 CORONARY ARTERY DISEASE OF NATIVE ARTERY OF NATIVE HEART WITH STABLE ANGINA PECTORIS: Primary | ICD-10-CM

## 2024-12-10 DIAGNOSIS — E03.9 HYPOTHYROIDISM, UNSPECIFIED TYPE: ICD-10-CM

## 2024-12-10 DIAGNOSIS — E11.9 TYPE 2 DIABETES MELLITUS WITHOUT COMPLICATION, WITHOUT LONG-TERM CURRENT USE OF INSULIN (MULTI): Primary | ICD-10-CM

## 2024-12-10 PROCEDURE — 1159F MED LIST DOCD IN RCRD: CPT | Performed by: STUDENT IN AN ORGANIZED HEALTH CARE EDUCATION/TRAINING PROGRAM

## 2024-12-10 PROCEDURE — 1124F ACP DISCUSS-NO DSCNMKR DOCD: CPT | Performed by: STUDENT IN AN ORGANIZED HEALTH CARE EDUCATION/TRAINING PROGRAM

## 2024-12-10 PROCEDURE — 3077F SYST BP >= 140 MM HG: CPT | Performed by: INTERNAL MEDICINE

## 2024-12-10 PROCEDURE — 93306 TTE W/DOPPLER COMPLETE: CPT

## 2024-12-10 PROCEDURE — 1126F AMNT PAIN NOTED NONE PRSNT: CPT | Performed by: INTERNAL MEDICINE

## 2024-12-10 PROCEDURE — 99214 OFFICE O/P EST MOD 30 MIN: CPT | Performed by: STUDENT IN AN ORGANIZED HEALTH CARE EDUCATION/TRAINING PROGRAM

## 2024-12-10 PROCEDURE — 3078F DIAST BP <80 MM HG: CPT | Performed by: INTERNAL MEDICINE

## 2024-12-10 PROCEDURE — 1159F MED LIST DOCD IN RCRD: CPT | Performed by: INTERNAL MEDICINE

## 2024-12-10 PROCEDURE — 1036F TOBACCO NON-USER: CPT | Performed by: STUDENT IN AN ORGANIZED HEALTH CARE EDUCATION/TRAINING PROGRAM

## 2024-12-10 PROCEDURE — 93306 TTE W/DOPPLER COMPLETE: CPT | Performed by: INTERNAL MEDICINE

## 2024-12-10 PROCEDURE — 1160F RVW MEDS BY RX/DR IN RCRD: CPT | Performed by: STUDENT IN AN ORGANIZED HEALTH CARE EDUCATION/TRAINING PROGRAM

## 2024-12-10 PROCEDURE — 1157F ADVNC CARE PLAN IN RCRD: CPT | Performed by: STUDENT IN AN ORGANIZED HEALTH CARE EDUCATION/TRAINING PROGRAM

## 2024-12-10 PROCEDURE — 99214 OFFICE O/P EST MOD 30 MIN: CPT | Performed by: INTERNAL MEDICINE

## 2024-12-10 PROCEDURE — 3078F DIAST BP <80 MM HG: CPT | Performed by: STUDENT IN AN ORGANIZED HEALTH CARE EDUCATION/TRAINING PROGRAM

## 2024-12-10 PROCEDURE — 1160F RVW MEDS BY RX/DR IN RCRD: CPT | Performed by: INTERNAL MEDICINE

## 2024-12-10 PROCEDURE — 3077F SYST BP >= 140 MM HG: CPT | Performed by: STUDENT IN AN ORGANIZED HEALTH CARE EDUCATION/TRAINING PROGRAM

## 2024-12-10 PROCEDURE — G2211 COMPLEX E/M VISIT ADD ON: HCPCS | Performed by: STUDENT IN AN ORGANIZED HEALTH CARE EDUCATION/TRAINING PROGRAM

## 2024-12-10 PROCEDURE — 1157F ADVNC CARE PLAN IN RCRD: CPT | Performed by: INTERNAL MEDICINE

## 2024-12-10 RX ORDER — DEXTROSE 4 G
TABLET,CHEWABLE ORAL
COMMUNITY
End: 2024-12-10 | Stop reason: SDUPTHER

## 2024-12-10 RX ORDER — DEXTROSE 4 G
TABLET,CHEWABLE ORAL
Qty: 1 EACH | Refills: 0 | Status: SHIPPED | OUTPATIENT
Start: 2024-12-10

## 2024-12-10 ASSESSMENT — ENCOUNTER SYMPTOMS
OCCASIONAL FEELINGS OF UNSTEADINESS: 0
LOSS OF SENSATION IN FEET: 0
OCCASIONAL FEELINGS OF UNSTEADINESS: 1
DEPRESSION: 0
DEPRESSION: 0
LOSS OF SENSATION IN FEET: 0

## 2024-12-10 ASSESSMENT — PAIN SCALES - GENERAL: PAINLEVEL_OUTOF10: 0-NO PAIN

## 2024-12-10 ASSESSMENT — PATIENT HEALTH QUESTIONNAIRE - PHQ9
1. LITTLE INTEREST OR PLEASURE IN DOING THINGS: NOT AT ALL
2. FEELING DOWN, DEPRESSED OR HOPELESS: NOT AT ALL
SUM OF ALL RESPONSES TO PHQ9 QUESTIONS 1 AND 2: 0

## 2024-12-10 NOTE — PATIENT INSTRUCTIONS
Echo done today   Continue medications  Labs ordered by  primary care physician   Follow up in 6 months

## 2024-12-10 NOTE — PROGRESS NOTES
Primary Care Physician: Christopher D'Amico, DO  Date of Visit: 12/10/2024  3:30 PM EST  Location of visit: [facility]     Chief Complaint:   Chief Complaint   Patient presents with    Follow-up     HPI / Summary:   Monica Hernandez is a 84 y.o. 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  Surgical Hx:   She has a past surgical history that includes Colonoscopy (2013);  section, classic (2013); Other surgical history (2019); Other surgical history (2019); Other surgical history (2019); Other surgical history (2016); and US guided fine percutaneous aspiration (2020).   Social Hx:   She reports that she quit smoking about 44 years ago. Her smoking use included cigarettes. She has never been exposed to tobacco smoke. She has never used smokeless tobacco. She reports that she does not drink alcohol and does not use drugs.  Family Hx:   Her family history is not on file.   Allergies:  Allergies   Allergen Reactions    Tetracyclines Unknown and Other    Sulfamethoxazole-Trimethoprim Rash     Outpatient Medications:  Current Outpatient Medications   Medication Instructions    alendronate (FOSAMAX) 70 mg, oral, Once Weekly    amLODIPine (NORVASC) 5 mg, oral, Daily    blood-glucose meter misc ONE TOUCH ULTRA GLUCOMETER    calcium carbonate-vitamin D3 (Calcium 600 with Vitamin D3) 600 mg-10 mcg (400 unit) chewable tablet 1  "tablet, 2 times daily    cholecalciferol (Vitamin D-3) 125 MCG (5000 UT) capsule 1 capsule, Daily    cyanocobalamin, vitamin B-12, 2,500 mcg tablet, sublingual Daily RT    escitalopram (LEXAPRO) 10 mg, oral, Daily    ezetimibe (ZETIA) 10 mg, oral, Daily    fluticasone (Flonase) 50 mcg/actuation nasal spray 2 sprays, Daily PRN    HYDROcodone-acetaminophen (Norco) 5-325 mg tablet 1 tablet, Every 4 hours PRN    hydrocortisone 2.5 % cream APPLY TWICE A DAY TO AFFECTED AREAS AS NEEDED    levothyroxine (SYNTHROID, LEVOXYL) 88 mcg, oral, Daily, Take on an empty stomach at the same time each day, either 30 to 60 minutes prior to breakfast    LORazepam (ATIVAN) 1-2 mg, oral, Every 8 hours PRN, Take 30 minutes prior to procedure.    losartan-hydrochlorothiazide (Hyzaar) 100-25 mg tablet 1 tablet, oral, Daily    multivit-iron-minerals-folic acid (Centrum Silver) 0.4 mg-300 mcg- 250 mcg tab 1 tablet, Daily    OneTouch Ultra Test strip Daily RT    predniSONE (Deltasone) 20 mg tablet     primidone (Mysoline) 50 mg tablet TAKE 2 & 1/2 TABLETS IN THE MORNING, 2 & 1/2 TABLETS IN THE AFTERNOON AND 3 TABLETS AT NIGHT    propranolol (INDERAL) 80 mg, oral, 3 times daily    rosuvastatin (CRESTOR) 40 mg, oral, Daily     Physical Exam:  Vitals:    12/10/24 1523   BP: 176/74   BP Location: Right arm   Patient Position: Sitting   BP Cuff Size: Adult   Pulse: 77   SpO2: 95%   Weight: 78.8 kg (173 lb 12.8 oz)   Height: 1.422 m (4' 8\")     Wt Readings from Last 5 Encounters:   12/10/24 78.8 kg (173 lb 12.8 oz)   12/10/24 79.4 kg (175 lb)   10/10/24 77.6 kg (171 lb)   07/16/24 77.6 kg (171 lb)   01/26/24 77.6 kg (171 lb)     Physical Exam  JVP not elevated. Carotid impulses are 2+ without overlying bruit.   Chest exhibits fair to good air movement with completely clear breath sounds.   The cardiac rhythm is regular with no premature beats.   Normal S1 and S2. No gallop, murmur or rub, or click.   Abdomen is soft and benign without focal " tenderness.   With no lower leg edema. The pedal pulses are intact.     Last Labs:  Hospital Outpatient Visit on 12/10/2024   Component Date Value    BSA 12/10/2024 1.77    Hospital Outpatient Visit on 10/24/2024   Component Date Value    WBC 10/24/2024 6.4     nRBC 10/24/2024 0.0     RBC 10/24/2024 5.96 (H)     Hemoglobin 10/24/2024 13.3     Hematocrit 10/24/2024 43.0     MCV 10/24/2024 72 (L)     MCH 10/24/2024 22.3 (L)     MCHC 10/24/2024 30.9 (L)     RDW 10/24/2024 16.6 (H)     Platelets 10/24/2024 286     Protime 10/24/2024 10.1     INR 10/24/2024 1.0     Case Report 10/24/2024                      Value:Non-gynecologic Cytology                          Case: [id number]                                   Authorizing Provider:  Isaac Espinoza MD   Collected:           10/24/2024 1143              Ordering Location:     [facility]       Received:            10/24/2024 1151                                     Center                                                                       Pathologist:           Lili Azevedo MD                                                         Specimen:    THYROID FINE NEEDLE ASPIRATION LEFT INFERIOR LOBE, Ultrasound guided left thyroid                   biopsy                                                                                     Final Cytological Interp* 10/24/2024                      Value:  A. THYROID, ULTRASOUND-GUIDED FINE NEEDLE ASPIRATION LEFT INFERIOR LOBE-        Cytologic findings consistent with a benign follicular nodule   Cystic component noted              10/24/2024                      Value:Slide(s) initially screened by SHANTELLE Capps at [facility] [address] [facility] [address]  By the signature on this report, the individual or group listed as making the Final Interpretation/Diagnosis certifies that they have reviewed this case.       Comment 10/24/2024                      Value:: Ravinder  MD Crystal.      Specimen Description 10/24/2024                      Value:A. THYROID FINE NEEDLE ASPIRATION LEFT INFERIOR LOBE.  Received 30 ml pink-red, cloudy needle rinse in Cytolyt with particles. The specimen has been sent for cytological analysis to the cytology department at [facility].        Specimen Processing Deta* 10/24/2024                      Value:A1 Slides Only (No Block)   A1-1 Pap Stain NGYN ThinPrep      Lab on 10/10/2024   Component Date Value    Thyroid Stimulating Horm* 10/10/2024 1.52    Procedure Visit on 09/25/2024   Component Date Value    Case Report 09/25/2024                      Value:Non-gynecologic Cytology                          Case: [id number]                                   Authorizing Provider:  Mikel Marcano MD         Collected:           09/25/2024 1300              Ordering Location:     [facility]      Received:            09/26/2024 0908                                     [place]                                                                     Pathologist:           Casandra Harris MD                                                         Specimen:    THYROID FINE NEEDLE ASPIRATION RIGHT SUPERIOR LOBE, RIGHT SUPERIOR 1.6                     Final Cytological Interp* 09/25/2024                      Value:  A. THYROID FINE NEEDLE ASPIRATION, RIGHT SUPERIOR LOBE:      Nondiagnostic specimen due to insufficient cellularity.           09/25/2024                      Value:Slide(s) initially screened by SHANTELLE Puente at [address] [address]  By the signature on this report, the individual or group listed as making the Final Interpretation/Diagnosis certifies that they have reviewed this case.       Clinical History 09/25/2024                      Value:1.6 CM      Specimen Description 09/25/2024                      Value:A. THYROID FINE NEEDLE ASPIRATION RIGHT SUPERIOR  LOBE.  Received ~30 ml pink clear needle rinse in Cytolyt without particles. The specimen has been sent for cytological analysis to the cytology department at [facility].    Also received is ~5ml of Afirma FNA/Protect solution acquired and held for possible molecular testing.          Specimen Processing Deta* 09/25/2024                      Value:A1 Slides Only (No Block)   A1-1 Pap Stain NGYN ThinPrep      Lab on 08/23/2024   Component Date Value    Thyroid Stimulating Horm* 08/23/2024 0.15 (L)     Thyroxine, Free 08/23/2024 1.88 (H)    Lab on 07/24/2024   Component Date Value    WBC 07/24/2024 5.4     nRBC 07/24/2024 0.0     RBC 07/24/2024 5.75 (H)     Hemoglobin 07/24/2024 12.5     Hematocrit 07/24/2024 41.5     MCV 07/24/2024 72 (L)     MCH 07/24/2024 21.7 (L)     MCHC 07/24/2024 30.1 (L)     RDW 07/24/2024 15.0 (H)     Platelets 07/24/2024 305     Cholesterol 07/24/2024 146     HDL-Cholesterol 07/24/2024 57.8     Cholesterol/HDL Ratio 07/24/2024 2.5     LDL Calculated 07/24/2024 64     VLDL 07/24/2024 25     Triglycerides 07/24/2024 123     Non HDL Cholesterol 07/24/2024 88     Glucose 07/24/2024 103 (H)     Sodium 07/24/2024 139     Potassium 07/24/2024 4.0     Chloride 07/24/2024 100     Bicarbonate 07/24/2024 29     Anion Gap 07/24/2024 14     Urea Nitrogen 07/24/2024 21     Creatinine 07/24/2024 0.63     eGFR 07/24/2024 89     Calcium 07/24/2024 9.9     Albumin 07/24/2024 4.3     Alkaline Phosphatase 07/24/2024 64     Total Protein 07/24/2024 7.0     AST 07/24/2024 15     Bilirubin, Total 07/24/2024 0.4     ALT 07/24/2024 17     Thyroid Stimulating Horm* 07/24/2024 0.03 (L)     Vitamin D, 25-Hydroxy, T* 07/24/2024 51     Hemoglobin A1C 07/24/2024 5.6     Estimated Average Glucose 07/24/2024 114     Thyroxine, Free 07/24/2024 2.98 (H)         Assessment/Plan         1. CAD. This patient is a very pleasant white female whose past history includes hypertension hyperlipidemia  and glucose intolerance. She was a very remote smoker. She does have a family history of CAD including a older sister who  of MI at the age of 78 who is a diabetic beginning at the age of 17. She does have a brother however aged 83 who has 3 stents. She also has a sister age 78 rheumatoid arthritis who also has CHF and a minor CVA. The patient herself has not had any ongoing concerning chest discomfort. The patient did have a CT coronary calcium score performed on 3/24/2011 with a value of 225. Based on her risk factors and CT coronary calcium score the patient subsequently had an echocardiogram performed on 2015 which showed normal LV chamber size with mild concentric LV hypertrophy and vigorous left ventricular systolic function with an estimated ejection fraction of greater than 65%. There was mild left atrial enlargement, mild aortic valve thickening, mild aortic valve insufficiency. The patient also had a pharmacological nuclear stress test on 2015 which showed normal myocardial perfusion with no defects and an LV ejection fraction of 55%. The patient is presently asymptomatic and will be monitored clinically. Prelim read of echo done today showed EF 65%, mild LVH, 1+ AI with 31 mm Hg gradient and mild MR, mild aortic sclerosis, mild LAE.  Patient does have a systolic ejection murmur.  Patient had pharmacologic nuclear stress testing done 2021 that was negative for ischemia.  The patient is presently feeling fairly well using a walker to ambulate  but less so in the house.  No lightheadedness dizziness palpitations or shortness of breath.  Patient doing fairly well without any new symptoms other than for some knee pain and had tremoring.  2. Glucose intolerance. The patient did ave lab work performed on 2021 which included a hemoglobin A1c of 6.8%. The patient presently is not on any oral hypoglycemic therapy.  3. Hyperlipidemia. The patient just started Crestor 40 mg daily with lipid  panel performed on 2021 including cholesterol 181 LDL 94 HDL 55 triglyceride 158. Start Zetia.  Most recent lipid panel on rosuvastatin 40 mg daily.  Zetia 10 mg daily.  Will recheck a fasting lipid panel CMP included cholesterol 170 LDL 74 HDL 64 triglyceride 160 glycohemoglobin and UACR before and next visit.  Lab work 2024 includes satisfactory lipid panel with cholesterol 146 LDL 64 HDL 58 triglyceride 123.  Patient will remain on combination of rosuvastatin 40 mg daily Zetia 10 mg daily.  Additional lab work included a normal CBC and CMP glycohemoglobin 5.6% vitamin D level 51.  4. Hypertension. Blood pressure is well controlled today. For now she will remain on amlodipine 5 mg daily rather than the 10 mg dose due to edema. She also will remain on losartan 100 mg daily, Aldactazide 25/25 mg daily and propanolol 80 mg 3 times a day.  Lab work from 1512/15/2020 3/2022 included a glycohemoglobin of 5.8 normal CBC CMP panel  vitamin D 44. systolic blood pressure slightly elevated today and will change losartan 100 mg daily to losartan -25 mg daily.  Of the note the patient is also on Aldactazide 25-25 mg daily.  Will monitor renal parameters and electrolytes.  Lab work from 2023 included a normal SMA panel with creatinine of 0.76.  Glycohemoglobin was 5.5% UACR not detected CBC normal.  Glycohemoglobin was 5.7% 2024.  Systolic blood pressure somewhat elevated.  For now we will continue present management.  Consider increasing dose of amlodipine in future.  Patient is on losartan -25 mg daily along with the amlodipine 5 mg daily and propranolol taken primarily for tremors.  5. Anxiety  6. Positive family history of CAD  7. Remote smoking  8. Status post  section x5  9. Remote excision of ganglion cyst from the left wrist.  10. Tremor. The patient does have a very noticeable tremor despite the propranolol 80 mg 3 times a day and primidone 50 three times per day.  11.  Minimal carotid vascular disease. The patient did have a carotid ultrasound on 9/8/2011 which showed minimal intimal thickening.   12. Obesity  13. Hx of thyroid nodule.  Lab work 7/24/2024 included TSH of 0.03 thyroxine of 2.98.  Her Synthroid dose was reduced from 100 mcg daily to 88 mcg daily.  14. History of COVID-19 vaccine #1 and #2.  15.  Aortic valvular stenosis.  This patient did have an echocardiogram on 5/8/2023 estimating the LV ejection fraction at 60-65%.  She does however have moderately severe aortic valve stenosis peak systolic pressure gradient of 55 mmHg with 1+ aortic insufficiency.  Other findings include 1-2+ mitral valve regurgitation and moderate pulmonary hypertension estimated PA systolic pressure 51 mmHg.  Patient will have a repeat echocardiogram performed at time of next visit in 6 months.  Repeat echocardiogram office visit 12/10/2024 demonstrates mild concentric LV hypertrophy preserved LV ejection fraction 60 to 65% moderately severe aortic valve stenosis peak systolic pressure gradient of 65 mmHg.  Patient will need to be monitored carefully and anticipate she will require TAVR within the next 1 to 3 years.  Repeat echocardiogram in 12 months.  Patient denies any chest discomfort or shortness of breath feeling well.        Orders:  No orders of the defined types were placed in this encounter.     Followup Appts:  Future Appointments   Date Time Provider Department Center   12/10/2024  3:30 PM Paul Edward MD [id number] [facility]   1/14/2025  1:30 PM Carlee Caho PA-C [id number] [facility]   1/27/2025  3:30 PM Nate New MD [id number] [facility]   6/10/2025  1:40 PM Christopher D'Amico, DO [id number] [facility]           ____________________________________________________________  Paul Edward MD  [facility]  Assistant Clinical Professor, [employer]  [facility]

## 2024-12-11 LAB
AORTIC VALVE MEAN GRADIENT: 41 MMHG
AORTIC VALVE PEAK VELOCITY: 4.03 M/S
AV PEAK GRADIENT: 65 MMHG
AVA (PEAK VEL): 0.55 CM2
AVA (VTI): 0.59 CM2
EJECTION FRACTION APICAL 4 CHAMBER: 60.3
EJECTION FRACTION: 63 %
LEFT ATRIUM VOLUME AREA LENGTH INDEX BSA: 31 ML/M2
LEFT VENTRICLE INTERNAL DIMENSION DIASTOLE: 4.54 CM (ref 3.5–6)
LEFT VENTRICULAR OUTFLOW TRACT DIAMETER: 1.82 CM
MITRAL VALVE E/A RATIO: 1.26
RIGHT VENTRICLE FREE WALL PEAK S': 11.9 CM/S
TRICUSPID ANNULAR PLANE SYSTOLIC EXCURSION: 2.2 CM

## 2024-12-17 ENCOUNTER — LAB (OUTPATIENT)
Dept: LAB | Facility: LAB | Age: 84
End: 2024-12-17
Payer: MEDICARE

## 2024-12-17 DIAGNOSIS — E03.9 HYPOTHYROIDISM, UNSPECIFIED TYPE: ICD-10-CM

## 2024-12-17 DIAGNOSIS — G25.0 ESSENTIAL TREMOR: ICD-10-CM

## 2024-12-17 DIAGNOSIS — E78.2 MIXED HYPERLIPIDEMIA: ICD-10-CM

## 2024-12-17 DIAGNOSIS — F41.9 ANXIETY: ICD-10-CM

## 2024-12-17 DIAGNOSIS — E11.9 TYPE 2 DIABETES MELLITUS WITHOUT COMPLICATION, WITHOUT LONG-TERM CURRENT USE OF INSULIN (MULTI): ICD-10-CM

## 2024-12-17 DIAGNOSIS — I25.118 CORONARY ARTERY DISEASE OF NATIVE ARTERY OF NATIVE HEART WITH STABLE ANGINA PECTORIS: ICD-10-CM

## 2024-12-17 DIAGNOSIS — I10 BENIGN ESSENTIAL HYPERTENSION: ICD-10-CM

## 2024-12-17 DIAGNOSIS — M85.80 OSTEOPENIA, UNSPECIFIED LOCATION: ICD-10-CM

## 2024-12-17 LAB
ALBUMIN SERPL BCP-MCNC: 4.4 G/DL (ref 3.4–5)
ALP SERPL-CCNC: 62 U/L (ref 33–136)
ALT SERPL W P-5'-P-CCNC: 19 U/L (ref 7–45)
ANION GAP SERPL CALC-SCNC: 11 MMOL/L (ref 10–20)
AST SERPL W P-5'-P-CCNC: 14 U/L (ref 9–39)
BILIRUB SERPL-MCNC: 0.4 MG/DL (ref 0–1.2)
BUN SERPL-MCNC: 17 MG/DL (ref 6–23)
CALCIUM SERPL-MCNC: 10.2 MG/DL (ref 8.6–10.6)
CHLORIDE SERPL-SCNC: 101 MMOL/L (ref 98–107)
CHOLEST SERPL-MCNC: 173 MG/DL (ref 0–199)
CHOLESTEROL/HDL RATIO: 2.7
CO2 SERPL-SCNC: 34 MMOL/L (ref 21–32)
CREAT SERPL-MCNC: 0.66 MG/DL (ref 0.5–1.05)
EGFRCR SERPLBLD CKD-EPI 2021: 87 ML/MIN/1.73M*2
ERYTHROCYTE [DISTWIDTH] IN BLOOD BY AUTOMATED COUNT: 15.2 % (ref 11.5–14.5)
EST. AVERAGE GLUCOSE BLD GHB EST-MCNC: 108 MG/DL
GLUCOSE SERPL-MCNC: 115 MG/DL (ref 74–99)
HBA1C MFR BLD: 5.4 %
HCT VFR BLD AUTO: 40 % (ref 36–46)
HDLC SERPL-MCNC: 63.1 MG/DL
HGB BLD-MCNC: 12.6 G/DL (ref 12–16)
LDLC SERPL CALC-MCNC: 76 MG/DL
MCH RBC QN AUTO: 23.1 PG (ref 26–34)
MCHC RBC AUTO-ENTMCNC: 31.5 G/DL (ref 32–36)
MCV RBC AUTO: 73 FL (ref 80–100)
NON HDL CHOLESTEROL: 110 MG/DL (ref 0–149)
NRBC BLD-RTO: 0 /100 WBCS (ref 0–0)
PLATELET # BLD AUTO: 267 X10*3/UL (ref 150–450)
POTASSIUM SERPL-SCNC: 4.1 MMOL/L (ref 3.5–5.3)
PROT SERPL-MCNC: 6.9 G/DL (ref 6.4–8.2)
RBC # BLD AUTO: 5.46 X10*6/UL (ref 4–5.2)
SODIUM SERPL-SCNC: 142 MMOL/L (ref 136–145)
TRIGL SERPL-MCNC: 171 MG/DL (ref 0–149)
TSH SERPL-ACNC: 2.24 MIU/L (ref 0.44–3.98)
VLDL: 34 MG/DL (ref 0–40)
WBC # BLD AUTO: 5.3 X10*3/UL (ref 4.4–11.3)

## 2024-12-17 PROCEDURE — 84443 ASSAY THYROID STIM HORMONE: CPT

## 2024-12-17 PROCEDURE — 80053 COMPREHEN METABOLIC PANEL: CPT

## 2024-12-17 PROCEDURE — 85027 COMPLETE CBC AUTOMATED: CPT

## 2024-12-17 PROCEDURE — 80061 LIPID PANEL: CPT

## 2024-12-17 PROCEDURE — 36415 COLL VENOUS BLD VENIPUNCTURE: CPT

## 2024-12-17 PROCEDURE — 83036 HEMOGLOBIN GLYCOSYLATED A1C: CPT

## 2024-12-18 NOTE — RESULT ENCOUNTER NOTE
Borderline elevated triglycerides, continue present good dietary habits, recommend Mediterranean diet.    Hemoglobin A1c of 5.4, excellent.  Continue current regimen.    Blood count shows likely thalassemia pattern, stable over the last 3 years.    Remaining labs essentially unremarkable.

## 2024-12-21 DIAGNOSIS — E78.2 MIXED HYPERLIPIDEMIA: ICD-10-CM

## 2024-12-23 RX ORDER — EZETIMIBE 10 MG/1
10 TABLET ORAL DAILY
Qty: 90 TABLET | Refills: 1 | Status: SHIPPED | OUTPATIENT
Start: 2024-12-23

## 2025-01-13 NOTE — PROGRESS NOTES
Subjective      Chief Complaint   Patient presents with    Left Knee - Pain, Follow-up        Past Surgical History:   Procedure Laterality Date     SECTION, CLASSIC  2013     Section    COLONOSCOPY  2013    Complete Colonoscopy    OTHER SURGICAL HISTORY  2019    Ganglion cyst excision    OTHER SURGICAL HISTORY  2019    Cataract surgery    OTHER SURGICAL HISTORY  2019    YAG laser capsulotomy    OTHER SURGICAL HISTORY  2016    Dilation Of Female Urethra    US GUIDED FINE PERCUTANEOUS ASPIRATION  2020    US GUIDED FINE PERCUTANEOUS ASPIRATION [facility]        Past Medical History:   Diagnosis Date    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     Coronary artery disease involving transplanted heart, angina presence unspecified, unspecified vessel or lesion type    Encounter for screening mammogram for malignant neoplasm of breast 2014    Visit for screening mammogram    Other conditions influencing health status     DEXA Body Composition Study    Other long term (current) drug therapy 2019    Chronic prescription benzodiazepine use    Personal history of diseases of the skin and subcutaneous tissue     History of psoriasis    Personal history of other diseases of the circulatory system     History of hypertension    Personal history of other diseases of the nervous system and sense organs     History of cataract    Unspecified secondary cataract     Secondary cataract        HPI  This 83 year old patient presents today with left knee pain (now currently 0/10). The patient states that this left knee pain had been worsening and persistent for months. The patient denies trauma or injury to the left knee. I previously evaluated her on 10- and treated her with cortisone injection. This impoved her left knee pain, however she did have facial erythema post injection. The patient states that the  left knee pain is worse with and aggravated by prolonged walking and standing. The patient states that she has resumed her normal activities of daily living. She is seen today ambulating with the use of a walker. Using spray on arthritis cream which seems to bring some relief.     Allergies   Allergen Reactions    Tetracyclines Unknown and Other    Cortisone Other     Redness of face and anxiety    Sulfamethoxazole-Trimethoprim Rash        No family history on file.     Social History     Socioeconomic History    Marital status:      Spouse name: Not on file    Number of children: Not on file    Years of education: Not on file    Highest education level: Not on file   Occupational History    Not on file   Tobacco Use    Smoking status: Former     Current packs/day: 0.00     Types: Cigarettes     Quit date:      Years since quittin.0     Passive exposure: Never    Smokeless tobacco: Never   Vaping Use    Vaping status: Never Used   Substance and Sexual Activity    Alcohol use: Never    Drug use: Never    Sexual activity: Defer   Other Topics Concern    Not on file   Social History Narrative    Not on file     Social Drivers of Health     Financial Resource Strain: Not on file   Food Insecurity: Not on file   Transportation Needs: Not on file   Physical Activity: Not on file   Stress: Not on file   Social Connections: Not on file   Intimate Partner Violence: Not on file   Housing Stability: Not on file        ROS  CARDIOLOGY:   Negative for chest pain, shortness of breath.   RESPIRATORY:   Negative for chest pain, shortness of breath.   MUSCULOSKELETAL:   See HPI for details.   NEUROLOGY:   Negative for tingling, numbness, weakness.    Objective    Body mass index is 38.79 kg/m².     Physical Exam  GENERAL:          General Appearance:  This is a pleasant patient with appropriate affect, in no acute distress.   DERMATOLOGY:          Skin: skin at the neck, upper and lower back, and trunk is intact.  There is no evidence of skin rash, skin breakdown or ulceration, or atrophic skin change.   EXTREMITIES:          Vascular:  Right, left hands and feet are warm with good color and pulses. Right and left calf and thigh are nontender and nonswollen.   NEUROLOGICAL:          Orientation:  Patient is alert and oriented to person, place, time and situation. Right and left upper and lower extremity motor and sensory examinations are intact.  MUSCULOSKELETAL: Neck: No tenderness. No pain or limitation with range of motion. Back: No tenderness. Straight leg test negative bilaterally. Right and left hips: No tenderness over the right or left greater trochanter. Right and left lower extremity in good position. Right knee: Nontender. No pain with gentle ROM. left knee: There is no tenderness over the knee. The patient has ROM from 0-120 degrees. McMurrays is equivocal. Anterior drawer and lachmans are negative. There is not an effusion present. The left knee is stable to valgus and varus stressing. Nontender in the left calf. Compartments are soft. Neurovascular is intact to light touch. The patient walks with a painful gait favoring the left  knee while walking.    BI mammo bilateral screening tomosynthesis    Result Date: 10/9/2024  Interpreted By:  Adina Birmingham and Liller Gregory STUDY: BI MAMMO BILATERAL SCREENING TOMOSYNTHESIS;  10/4/2024 2:01 pm   ACCESSION NUMBER(S): [id number]   ORDERING CLINICIAN: CHRISTOPHER D'AMICO   INDICATION: Screening.   ,Z12.31 Encounter for screening mammogram for malignant neoplasm of breast   COMPARISON: Mammogram 08/14/2023, 02/10/2023, and 07/22/2022.   FINDINGS: 2D and tomosynthesis images were reviewed at 1 mm slice thickness. Best images possible.   Density:  There are scattered areas of fibroglandular density.   A stable benign mass is seen in the central lateral right breast at mid depth. No suspicious masses or calcifications are identified in either breast.       No mammographic  evidence of malignancy.   BI-RADS CATEGORY: BI-RADS Category:  2 Benign. Recommendation:  Annual Screening. Recommended Date:  1 Year. Laterality:  Bilateral.       For any future breast imaging appointments, please call [phone] ([phone]).   I personally reviewed the images/study and I agree with the findings as stated above by resident physician, Dr. Holland Hall.   MACRO: None   Signed by: Adina Birmingham 10/9/2024 2:21 PM Dictation workstation:   [id number]     Patient ID: Monica Hernandez is a 84 y.o. female.    Monica was seen today for pain and follow-up.  Diagnoses and all orders for this visit:  Chronic pain of left knee  Chondrocalcinosis of left knee  Primary osteoarthritis of left knee  Options are discussed with the patient in detail. The patient states that the left knee pain has improved and we will defer cortisone injection in office today. The patient is instructed regarding activity modification and to use a walker while ambulating, ice, provider directed at home gentle strengthening and ROM exercises, and the appropriate use of Tylenol as needed for pain with its potential adverse reactions and side effects. The patient understands. Return as needed, Please note that this report has been produced using speech recognition software.  It may contain errors related to grammar, punctuation or spelling.  Electronically signed, but not reviewed.       Carlee Chao PA-C

## 2025-01-14 ENCOUNTER — OFFICE VISIT (OUTPATIENT)
Dept: ORTHOPEDIC SURGERY | Facility: CLINIC | Age: 85
End: 2025-01-14
Payer: MEDICARE

## 2025-01-14 VITALS — WEIGHT: 173 LBS | BODY MASS INDEX: 38.92 KG/M2 | HEIGHT: 56 IN

## 2025-01-14 DIAGNOSIS — M25.562 CHRONIC PAIN OF LEFT KNEE: Primary | ICD-10-CM

## 2025-01-14 DIAGNOSIS — M11.262 CHONDROCALCINOSIS OF LEFT KNEE: ICD-10-CM

## 2025-01-14 DIAGNOSIS — G89.29 CHRONIC PAIN OF LEFT KNEE: Primary | ICD-10-CM

## 2025-01-14 DIAGNOSIS — M17.12 PRIMARY OSTEOARTHRITIS OF LEFT KNEE: ICD-10-CM

## 2025-01-14 PROCEDURE — 1160F RVW MEDS BY RX/DR IN RCRD: CPT | Performed by: PHYSICIAN ASSISTANT

## 2025-01-14 PROCEDURE — 99213 OFFICE O/P EST LOW 20 MIN: CPT | Performed by: PHYSICIAN ASSISTANT

## 2025-01-14 PROCEDURE — 1159F MED LIST DOCD IN RCRD: CPT | Performed by: PHYSICIAN ASSISTANT

## 2025-01-14 PROCEDURE — 1125F AMNT PAIN NOTED PAIN PRSNT: CPT | Performed by: PHYSICIAN ASSISTANT

## 2025-01-14 PROCEDURE — 1157F ADVNC CARE PLAN IN RCRD: CPT | Performed by: PHYSICIAN ASSISTANT

## 2025-01-14 PROCEDURE — 1036F TOBACCO NON-USER: CPT | Performed by: PHYSICIAN ASSISTANT

## 2025-01-14 ASSESSMENT — ENCOUNTER SYMPTOMS
LOSS OF SENSATION IN FEET: 0
OCCASIONAL FEELINGS OF UNSTEADINESS: 0
DEPRESSION: 0

## 2025-01-14 ASSESSMENT — PAIN - FUNCTIONAL ASSESSMENT: PAIN_FUNCTIONAL_ASSESSMENT: 0-10

## 2025-01-14 ASSESSMENT — LIFESTYLE VARIABLES
HOW OFTEN DO YOU HAVE A DRINK CONTAINING ALCOHOL: NEVER
SKIP TO QUESTIONS 9-10: 1
HOW MANY STANDARD DRINKS CONTAINING ALCOHOL DO YOU HAVE ON A TYPICAL DAY: PATIENT DOES NOT DRINK
HOW OFTEN DO YOU HAVE SIX OR MORE DRINKS ON ONE OCCASION: NEVER
AUDIT-C TOTAL SCORE: 0

## 2025-01-14 ASSESSMENT — PAIN SCALES - GENERAL
PAINLEVEL_OUTOF10: 2
PAINLEVEL_OUTOF10: 2

## 2025-01-14 NOTE — PATIENT INSTRUCTIONS
Thank you for coming to see us today!     Continue to use tylenol for pain control.   Rest, ice and elevate and remember to do exercises.   Use a walker for support    Follow up  as needed

## 2025-01-24 NOTE — PROGRESS NOTES
Subjective     Monica Hernandez is a 84 y.o. year old female seen in follow-up for essential tremor, lumbar stenosis, imbalance, memory concerns.    HPI    84-year-old right-handed woman with past medical history significant for hypertension, type 2 diabetes, hyperlipidemia, severe lumbar stenosis associated with chronic low back pain, cataract surgery, former smoking.     I saw her initially on 9/17/2020 referred for question of a parkinsonian condition. In fact she exhibited head and limb tremor compatible with essential tremor, and a nonspecific gait disturbance. At the time I saw her she was already on a chronic stable regimen of primidone 100 mg 3 times daily and propranolol 80 mg 3 times daily as well as gabapentin 600 mg 3 times daily. Her head and left more than right upper extremity tremor was persistent despite this regimen. Her gait displayed flexion at the waist suggesting lumbar stenosis and she moved stably with a walker.     Labs were unrevealing including serum CK, primidone/phenobarbital panel, and methylmalonic acid level. I obtained a head CT which was essentially unremarkable for age. I recommended a movement disorder subspecialty consultation given that she was already on 3 agents for essential tremor.     She saw Dr. Austin in December 2020 for movement disorders consultation who recommended primidone titration, which she was starting when I evaluated her by audiovisual visit on 3/12/2021.     I evaluated her again on 6/21/2021 by telephone visit as she did not have video capability at that time. She was doing reasonably well and I did not make any change in her regimen.     I evaluated her again on 1/4/2022, in the office. She noted adequate tremor control on a stable primidone regimen of 125 mg morning and midday and 150 mg at night and propranolol regimen of 80 mg 3 times daily. She noted subjective stability of leg strength and was using a Rollator for balance.     I evaluated her most  recently on 1/25/2024.  Her tremor was only moderately controlled but she did not wish to modify her primidone or propranolol regimens at that time.  She was getting by with a rollator and not noting symptoms of neurogenic claudication.  She denied interval falls.  She voiced memory concerns but did well on office testing and I reassured her I did not get an impression of dementia or incipient dementia.  I suspected simply normal age associated cognitive decline.  I did send her for a methylmalonic acid level to check the adequacy of her B12 replacement regimen, and a primidone/phenobarbital level.  Labs were in normal range.    She is evaluated again today in the office, accompanied by her .    She reports fair control of essential tremor on her current medication regimen.  There is enough control for purposes of using utensils and drinking from a cup or glass.  She indicates that she does not want to increase her medication doses.    Her current regimen for essential tremor is primidone 125 mg in the morning and midday and 150 mg at night, using the 50 mg tablets.  She does not feel oversedated nor does she note other side effects from primidone.  She continues also on propranolol 80 mg 3 times daily.    In the past she was additionally on gabapentin 600 mg 3 times daily but does not recall greater efficacy for tremor control when she was taking gabapentin.    She remains ambulatory with a rollator, which she brings with her today.  Her gait is limited, but not by typical neurogenic claudication symptoms.  Rather she is getting focal pain at the left knee.  She has undergone injections for this previously and has seen orthopedics.  The knee is starting to become painful again at times when she is weightbearing, and the pain is relieved by sitting down.    She denies interval falls.  She uses the rollator fairly consistently.    From a cognitive standpoint she acknowledges some minor forgetfulness but does  not endorse her memory notably worsening since the last visit.  At baseline she does not drive.  She reports reasonable quality of sleep.    I reviewed recent labs pertinent to primidone.  CMP from 12/17/2024 shows normal hepatic function numbers, and CBC from the same date shows a normal platelet count of 267.      Review of Systems    As per the history of present illness    Patient Active Problem List   Diagnosis    Abnormal CBC    Acquired spondylolisthesis    Abnormal mammogram    Allergic rhinitis    Anxiety    Aortic stenosis    Arthritis of multiple sites    Balance disorder    Benign essential hypertension    Benign familial tremor    Blurred vision, bilateral    CAD (coronary artery disease)    Degeneration of intervertebral disc of lumbar region    Diabetes mellitus type 2, uncomplicated (Multi)    Difficulty walking    Dry eyes    Epiretinal membrane    Esophageal reflux    History of YAG laser iridotomy    Incomplete emptying of bladder    Insomnia    Backache    Left hip pain    Left knee pain    Left shoulder pain    Lumbar canal stenosis    Lumbar radiculopathy    Mixed hyperlipidemia    Class 2 severe obesity due to excess calories with serious comorbidity and body mass index (BMI) of 38.0 to 38.9 in adult    Myofascial pain    Nontoxic multinodular goiter    Osteoarthritis of left AC (acromioclavicular) joint    Osteopenia    Overactive bladder    Parotid mass    Persistent cough    Presence of artificial intra-ocular lens    Proximal muscle weakness    Pseudophakia of both eyes    Refractive error    Right foot pain    Sacroiliac joint dysfunction of both sides    Sacroiliac joint pain    Sciatica of left side    Spondylosis of lumbar spine    Thyroid nodule    Tremor    Urinary incontinence    Vitamin D deficiency    Vitreous floaters    Wears eyeglasses    Routine general medical examination at a health care facility    Chest congestion    Pulmonary congestion    History of cataract    History  of diabetes mellitus    History of hypertension    Secondary cataract    Memory impairment    Chondrocalcinosis of left knee    Primary osteoarthritis of left knee     Past Medical History:   Diagnosis Date    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     Coronary artery disease involving transplanted heart, angina presence unspecified, unspecified vessel or lesion type    Encounter for screening mammogram for malignant neoplasm of breast 2014    Visit for screening mammogram    Other conditions influencing health status     DEXA Body Composition Study    Other long term (current) drug therapy 2019    Chronic prescription benzodiazepine use    Personal history of diseases of the skin and subcutaneous tissue     History of psoriasis    Personal history of other diseases of the circulatory system     History of hypertension    Personal history of other diseases of the nervous system and sense organs     History of cataract    Unspecified secondary cataract     Secondary cataract     Past Surgical History:   Procedure Laterality Date     SECTION, CLASSIC  2013     Section    COLONOSCOPY  2013    Complete Colonoscopy    OTHER SURGICAL HISTORY  2019    Ganglion cyst excision    OTHER SURGICAL HISTORY  2019    Cataract surgery    OTHER SURGICAL HISTORY  2019    YAG laser capsulotomy    OTHER SURGICAL HISTORY  2016    Dilation Of Female Urethra    US GUIDED FINE PERCUTANEOUS ASPIRATION  2020    US GUIDED FINE PERCUTANEOUS ASPIRATION [facility]     Social History     Tobacco Use    Smoking status: Former     Current packs/day: 0.00     Types: Cigarettes     Quit date: 1980     Years since quittin.0     Passive exposure: Never    Smokeless tobacco: Never   Substance Use Topics    Alcohol use: Never     family history is not on file.    Current Outpatient Medications:     alendronate (Fosamax) 70 mg  tablet, TAKE ONE TABLET BY MOUTH ONCE A WEEK, Disp: 12 tablet, Rfl: 0    amLODIPine (Norvasc) 5 mg tablet, Take 1 tablet (5 mg) by mouth once daily., Disp: 90 tablet, Rfl: 3    blood-glucose meter misc, ONE TOUCH ULTRA GLUCOMETER, Disp: 1 each, Rfl: 0    calcium carbonate-vitamin D3 (Calcium 600 with Vitamin D3) 600 mg-10 mcg (400 unit) chewable tablet, Chew 1 tablet 2 times a day., Disp: , Rfl:     cholecalciferol (Vitamin D-3) 125 MCG (5000 UT) capsule, Take 1 capsule (125 mcg) by mouth once daily., Disp: , Rfl:     cyanocobalamin, vitamin B-12, 2,500 mcg tablet, sublingual, Place under the tongue once daily., Disp: , Rfl:     escitalopram (Lexapro) 10 mg tablet, Take 1 tablet (10 mg) by mouth once daily., Disp: 90 tablet, Rfl: 1    ezetimibe (Zetia) 10 mg tablet, TAKE ONE TABLET BY MOUTH EVERY DAY, Disp: 90 tablet, Rfl: 1    fluticasone (Flonase) 50 mcg/actuation nasal spray, Administer 2 sprays into each nostril once daily as needed., Disp: , Rfl:     HYDROcodone-acetaminophen (Norco) 5-325 mg tablet, Take 1 tablet by mouth every 4 hours if needed for pain., Disp: , Rfl:     hydrocortisone 2.5 % cream, APPLY TWICE A DAY TO AFFECTED AREAS AS NEEDED, Disp: , Rfl:     levothyroxine (Synthroid, Levoxyl) 88 mcg tablet, Take 1 tablet (88 mcg) by mouth early in the morning.. Take on an empty stomach at the same time each day, either 30 to 60 minutes prior to breakfast, Disp: 30 tablet, Rfl: 11    LORazepam (Ativan) 1 mg tablet, Take 1-2 tablets (1-2 mg) by mouth every 8 hours if needed for anxiety for up to 2 days. Take 30 minutes prior to procedure., Disp: 4 tablet, Rfl: 0    losartan-hydrochlorothiazide (Hyzaar) 100-25 mg tablet, Take 1 tablet by mouth once daily., Disp: 30 tablet, Rfl: 11    multivit-iron-minerals-folic acid (Centrum Silver) 0.4 mg-300 mcg- 250 mcg tab, Take 1 tablet by mouth once daily., Disp: , Rfl:     OneTouch Ultra Test strip, once daily., Disp: , Rfl:     predniSONE (Deltasone) 20 mg tablet, ,  Disp: , Rfl:     primidone (Mysoline) 50 mg tablet, TAKE 2 & 1/2 TABLETS IN THE MORNING, 2 & 1/2 TABLETS IN THE AFTERNOON AND 3 TABLETS AT NIGHT, Disp: 720 tablet, Rfl: 0    propranolol (Inderal) 80 mg tablet, Take 1 tablet (80 mg) by mouth 3 times a day., Disp: 270 tablet, Rfl: 3    rosuvastatin (Crestor) 40 mg tablet, Take 1 tablet (40 mg) by mouth once daily., Disp: 90 tablet, Rfl: 3  Allergies   Allergen Reactions    Tetracyclines Unknown and Other    Cortisone Other     Redness of face and anxiety    Sulfamethoxazole-Trimethoprim Rash       Objective   Neurological Exam  Physical Exam    Physical Examination:    General: Alert woman who was ambulatory with a rollator.    Mental Status: Clear sensorium without fluctuation.  Appropriate in conversation.  Fluent unremarkable speech without paraphasic errors or hesitancy.  Oriented to self, date and day of the week, suburb, street and floor.  She registered 3/3 and after distraction recalled 3/3 promptly without cueing.  5/5 serial 7 subtractions, done slowly.    Cranial Nerves: No gaze deviation or ptosis.  Symmetric facial motor function.  Grossly intact hearing for purposes of communication.  Minimal voice tremor.  No dysarthria.    Motor: Muscle tone was normal throughout.  Confrontation strength was symmetrically 5/5 throughout the lower extremities.    Coordination: Moderate symmetric rapid postural-kinetic tremor of the upper extremities.  No intention tremor or dysmetria on finger to finger maneuver.  Rapid low amplitude head tremor.  No parkinsonian rest tremor.  No myoclonus or dystonic posturing.  Symmetric alternating hand movements.    Station: Intact and stable.    Gait: Stable observed over a short distance with rollator and notable for an antalgic appearance with her frequently pausing due to left knee pain.      Assessment/Plan     She appears neurologically stable.    Although she still has quite evident tremor of both upper extremities on exam,  she does not want to take any more medication than she is currently on.  She feels the tremor control is adequate for her purposes.    As above, recent labs pertinent to primidone were without concern.    I provided refills for her current primidone regimen to her retail pharmacy.    She will continue her current propranolol regimen.  I discussed if anything her systolic blood pressure is significantly elevated today, and she indicated that this is often the case.  I advised her to continue following with her PCP for monitoring and management of hypertension.    Her gait is limited and she benefits from a rollator.  However, the limitation lately does not suggest neurogenic claudication but rather a focal issue at her left knee, for which she has seen orthopedics and undergone an injection previously.  I suggested she consider another appointment with her orthopedist, and in the meantime she will use topical strategies which have provided some relief.    From a cognitive standpoint she does well on informal testing today.  I think she is noting normal age associated cognitive decline.    I recommended she follow-up with movement disorders subspecialty neurology, to which I provided a referral.

## 2025-01-27 ENCOUNTER — APPOINTMENT (OUTPATIENT)
Dept: NEUROLOGY | Facility: CLINIC | Age: 85
End: 2025-01-27
Payer: MEDICARE

## 2025-01-27 VITALS
BODY MASS INDEX: 38.79 KG/M2 | HEIGHT: 56 IN | SYSTOLIC BLOOD PRESSURE: 180 MMHG | HEART RATE: 74 BPM | DIASTOLIC BLOOD PRESSURE: 77 MMHG

## 2025-01-27 DIAGNOSIS — G25.0 ESSENTIAL TREMOR: Primary | ICD-10-CM

## 2025-01-27 DIAGNOSIS — M48.061 SPINAL STENOSIS OF LUMBAR REGION, UNSPECIFIED WHETHER NEUROGENIC CLAUDICATION PRESENT: ICD-10-CM

## 2025-01-27 DIAGNOSIS — R41.3 COMPLAINTS OF MEMORY DISTURBANCE: ICD-10-CM

## 2025-01-27 PROCEDURE — 3077F SYST BP >= 140 MM HG: CPT | Performed by: PSYCHIATRY & NEUROLOGY

## 2025-01-27 PROCEDURE — 99214 OFFICE O/P EST MOD 30 MIN: CPT | Performed by: PSYCHIATRY & NEUROLOGY

## 2025-01-27 PROCEDURE — 1036F TOBACCO NON-USER: CPT | Performed by: PSYCHIATRY & NEUROLOGY

## 2025-01-27 PROCEDURE — 1159F MED LIST DOCD IN RCRD: CPT | Performed by: PSYCHIATRY & NEUROLOGY

## 2025-01-27 PROCEDURE — 3078F DIAST BP <80 MM HG: CPT | Performed by: PSYCHIATRY & NEUROLOGY

## 2025-01-27 PROCEDURE — 1160F RVW MEDS BY RX/DR IN RCRD: CPT | Performed by: PSYCHIATRY & NEUROLOGY

## 2025-01-27 PROCEDURE — 1157F ADVNC CARE PLAN IN RCRD: CPT | Performed by: PSYCHIATRY & NEUROLOGY

## 2025-01-27 RX ORDER — PRIMIDONE 50 MG/1
TABLET ORAL
Qty: 720 TABLET | Refills: 3 | Status: SHIPPED | OUTPATIENT
Start: 2025-01-27

## 2025-01-27 NOTE — PATIENT INSTRUCTIONS
We discussed that although you continue to note hand tremor, it is not interfering in your daily activities to the point that you would want to try any more medication at this point.    I sent a refill order for primidone to your [facility] pharmacy.  I did not change the dose.    I reviewed your labs from December which showed normal liver function and platelet count.  You do not need primidone related labs today.    You should continue using the walker to stabilize your gait and reduce fall risk, especially in light of the issues you have been having at the left knee.    You do well on memory testing in the office today.    I am providing a referral to movement disorders neurology for your next appointment.  It would be reasonable to see them in 9-12 months, sooner if you decide you would like to try other measures for your essential tremor.

## 2025-02-19 DIAGNOSIS — M85.80 OSTEOPENIA, UNSPECIFIED LOCATION: ICD-10-CM

## 2025-02-20 RX ORDER — ALENDRONATE SODIUM 70 MG/1
70 TABLET ORAL
Qty: 12 TABLET | Refills: 0 | Status: SHIPPED | OUTPATIENT
Start: 2025-02-23

## 2025-02-22 DIAGNOSIS — I10 BENIGN ESSENTIAL HYPERTENSION: ICD-10-CM

## 2025-02-24 DIAGNOSIS — F41.9 ANXIETY: ICD-10-CM

## 2025-02-24 RX ORDER — AMLODIPINE BESYLATE 5 MG/1
5 TABLET ORAL DAILY
Qty: 90 TABLET | Refills: 3 | Status: SHIPPED | OUTPATIENT
Start: 2025-02-24

## 2025-02-24 RX ORDER — ESCITALOPRAM OXALATE 10 MG/1
10 TABLET ORAL DAILY
Qty: 90 TABLET | Refills: 1 | Status: SHIPPED | OUTPATIENT
Start: 2025-02-24

## 2025-03-08 DIAGNOSIS — I10 BENIGN ESSENTIAL HYPERTENSION: ICD-10-CM

## 2025-03-09 RX ORDER — PROPRANOLOL HYDROCHLORIDE 80 MG/1
80 TABLET ORAL 3 TIMES DAILY
Qty: 270 TABLET | Refills: 3 | Status: SHIPPED | OUTPATIENT
Start: 2025-03-09

## 2025-04-02 DIAGNOSIS — F41.9 ANXIETY: ICD-10-CM

## 2025-04-02 RX ORDER — ESCITALOPRAM OXALATE 10 MG/1
10 TABLET ORAL DAILY
Qty: 90 TABLET | Refills: 0 | Status: SHIPPED | OUTPATIENT
Start: 2025-04-02

## 2025-04-04 DIAGNOSIS — I35.0 AORTIC VALVE STENOSIS, ETIOLOGY OF CARDIAC VALVE DISEASE UNSPECIFIED: ICD-10-CM

## 2025-04-04 RX ORDER — LOSARTAN POTASSIUM AND HYDROCHLOROTHIAZIDE 25; 100 MG/1; MG/1
1 TABLET ORAL DAILY
Qty: 30 TABLET | Refills: 11 | Status: SHIPPED | OUTPATIENT
Start: 2025-04-04

## 2025-04-08 ENCOUNTER — TELEPHONE (OUTPATIENT)
Dept: OTOLARYNGOLOGY | Facility: CLINIC | Age: 85
End: 2025-04-08
Payer: MEDICARE

## 2025-04-08 DIAGNOSIS — E04.1 THYROID NODULE: Primary | ICD-10-CM

## 2025-04-08 NOTE — TELEPHONE ENCOUNTER
Patient is S/P benign bx of thyroid nodule per IR 10/2024. She is calling for thyroid US order and is scheduled for annual appt 10/14/2025. Please sign order.

## 2025-04-09 DIAGNOSIS — E04.1 THYROID NODULE: Primary | ICD-10-CM

## 2025-04-17 ENCOUNTER — OFFICE VISIT (OUTPATIENT)
Dept: URGENT CARE | Age: 85
End: 2025-04-17
Payer: MEDICARE

## 2025-04-17 VITALS
OXYGEN SATURATION: 94 % | TEMPERATURE: 97.6 F | RESPIRATION RATE: 20 BRPM | HEART RATE: 88 BPM | DIASTOLIC BLOOD PRESSURE: 86 MMHG | SYSTOLIC BLOOD PRESSURE: 144 MMHG

## 2025-04-17 DIAGNOSIS — I10 HYPERTENSION, ESSENTIAL: ICD-10-CM

## 2025-04-17 DIAGNOSIS — J02.9 SORE THROAT: ICD-10-CM

## 2025-04-17 DIAGNOSIS — J06.9 ACUTE URI: Primary | ICD-10-CM

## 2025-04-17 LAB
POC HUMAN RHINOVIRUS PCR: POSITIVE
POC INFLUENZA A VIRUS PCR: NEGATIVE
POC INFLUENZA B VIRUS PCR: NEGATIVE
POC RESPIRATORY SYNCYTIAL VIRUS PCR: NEGATIVE
POC STREPTOCOCCUS PYOGENES (GROUP A STREP) PCR: NEGATIVE

## 2025-04-17 RX ORDER — BENZONATATE 200 MG/1
200 CAPSULE ORAL 3 TIMES DAILY PRN
Qty: 30 CAPSULE | Refills: 0 | Status: SHIPPED | OUTPATIENT
Start: 2025-04-17 | End: 2025-04-27

## 2025-04-17 RX ORDER — LEVOCETIRIZINE DIHYDROCHLORIDE 5 MG/1
5 TABLET, FILM COATED ORAL EVERY EVENING
Qty: 30 TABLET | Refills: 0 | Status: SHIPPED | OUTPATIENT
Start: 2025-04-17 | End: 2025-05-17

## 2025-04-17 ASSESSMENT — ENCOUNTER SYMPTOMS
RHINORRHEA: 0
CHILLS: 0
HEADACHES: 0
EYE PAIN: 0
BACK PAIN: 0
ARTHRALGIAS: 0
SORE THROAT: 1
APPETITE CHANGE: 0
COUGH: 0
NAUSEA: 0
CONSTIPATION: 0
ABDOMINAL PAIN: 0
FEVER: 0
WOUND: 0
CHEST TIGHTNESS: 0
SHORTNESS OF BREATH: 0
SINUS PAIN: 0
VOMITING: 0
DIARRHEA: 0
MYALGIAS: 0
DIFFICULTY URINATING: 0
SINUS PRESSURE: 0
DIZZINESS: 0
TROUBLE SWALLOWING: 0
FATIGUE: 0
PALPITATIONS: 0
NECK PAIN: 0

## 2025-04-17 ASSESSMENT — VISUAL ACUITY: OU: 1

## 2025-04-17 NOTE — PROGRESS NOTES
Subjective   Patient ID: Monica Hernandez is a 84 y.o. female. They present today with a chief complaint of Sore Throat (4 days ).    History of Present Illness  Patient is an 85yo female who presents with a sore throat x 4 days.       Sore Throat   Associated symptoms include congestion. Pertinent negatives include no abdominal pain, coughing, diarrhea, ear pain, headaches, neck pain, shortness of breath, trouble swallowing or vomiting.       Past Medical History  Allergies as of 04/17/2025 - Reviewed 04/17/2025   Allergen Reaction Noted    Tetracyclines Unknown and Other 06/13/2023    Cortisone Other 01/14/2025    Sulfamethoxazole-trimethoprim Rash 06/13/2023       Prescriptions Prior to Admission[1]     Medical History[2]    Surgical History[3]     reports that she quit smoking about 45 years ago. Her smoking use included cigarettes. She has never been exposed to tobacco smoke. She has never used smokeless tobacco. She reports that she does not drink alcohol and does not use drugs.    Review of Systems  Review of Systems   Constitutional:  Negative for appetite change, chills, fatigue and fever.   HENT:  Positive for congestion and sore throat. Negative for dental problem, ear pain, hearing loss, postnasal drip, rhinorrhea, sinus pressure, sinus pain, sneezing and trouble swallowing.    Eyes:  Negative for pain.   Respiratory:  Negative for cough, chest tightness and shortness of breath.    Cardiovascular:  Negative for chest pain and palpitations.   Gastrointestinal:  Negative for abdominal pain, constipation, diarrhea, nausea and vomiting.   Genitourinary:  Negative for difficulty urinating.   Musculoskeletal:  Negative for arthralgias, back pain, gait problem, myalgias and neck pain.   Skin:  Negative for rash and wound.   Neurological:  Negative for dizziness and headaches.   Psychiatric/Behavioral:  Negative for self-injury and suicidal ideas.                                   Objective    Vitals:     04/17/25 1126   BP: 144/86   Pulse: 88   Resp: 20   Temp: 36.4 °C (97.6 °F)   SpO2: 94%     No LMP recorded (lmp unknown). Patient is postmenopausal.    Physical Exam  Vitals reviewed.   Constitutional:       General: She is awake. She is not in acute distress.     Appearance: Normal appearance. She is well-groomed and overweight. She is not ill-appearing.   HENT:      Head: Normocephalic and atraumatic.      Right Ear: Hearing, tympanic membrane, ear canal and external ear normal.      Left Ear: Hearing, tympanic membrane, ear canal and external ear normal.      Nose: Congestion present. No nasal deformity, signs of injury or rhinorrhea.      Mouth/Throat:      Lips: Pink.      Mouth: Mucous membranes are moist. No injury.      Dentition: Normal dentition.      Pharynx: Oropharynx is clear. Uvula midline. Postnasal drip present. No pharyngeal swelling, oropharyngeal exudate, posterior oropharyngeal erythema or uvula swelling.      Tonsils: No tonsillar exudate.   Eyes:      General: Lids are normal. Vision grossly intact. Gaze aligned appropriately.   Cardiovascular:      Rate and Rhythm: Normal rate and regular rhythm.      Heart sounds: Normal heart sounds, S1 normal and S2 normal. Heart sounds not distant. No murmur heard.     No friction rub. No gallop.   Pulmonary:      Effort: Pulmonary effort is normal. No tachypnea, bradypnea, accessory muscle usage, prolonged expiration, respiratory distress or retractions.      Breath sounds: Normal breath sounds and air entry. No stridor, decreased air movement or transmitted upper airway sounds. No decreased breath sounds, wheezing, rhonchi or rales.   Lymphadenopathy:      Head:      Right side of head: No submental, submandibular, tonsillar, preauricular, posterior auricular or occipital adenopathy.      Left side of head: No submental, submandibular, tonsillar, preauricular, posterior auricular or occipital adenopathy.      Cervical:      Right cervical: No  superficial cervical adenopathy.     Left cervical: No superficial cervical adenopathy.   Skin:     General: Skin is warm and dry.      Capillary Refill: Capillary refill takes less than 2 seconds.   Neurological:      General: No focal deficit present.      Mental Status: She is alert.      Gait: Gait is intact.   Psychiatric:         Attention and Perception: Attention and perception normal.         Mood and Affect: Mood and affect normal.         Speech: Speech normal.         Behavior: Behavior normal. Behavior is cooperative.         Procedures    Point of Care Test & Imaging Results from this visit  Results for orders placed or performed in visit on 04/17/25   POCT SPOTFIRE R/ST Panel Mini w/Strep A ([facility]) manually resulted   Result Value Ref Range    POC Group A Strep, PCR Negative Negative    POC Respiratory Syncytial Virus PCR Negative Negative    POC Influenza A Virus PCR Negative Negative    POC Influenza B Virus PCR Negative Negative    POC Human Rhinovirus PCR Positive (A) Negative      Imaging  No results found.    Cardiology, Vascular, and Other Imaging  No other imaging results found for the past 2 days      Diagnostic study results (if any) were reviewed by JULIEN Higuera.    Assessment/Plan   Allergies, medications, history, and pertinent labs/EKGs/Imaging reviewed by JULIEN Higuera.     Medical Decision Making  Patient is positive for rhinovirus. Physical exam and duration of symptoms consistent with a viral illness. Advise symptomatic care including warm salt water gargles, humidified air, rest, fluids, evening antihistamine in addtion to the medication regimen detailed below.     Patient advised to monitor blood pressure at home with an at home cuff and contact PCP if blood pressure remains elevated. Patient is stable and in no acute distress. Denies h/a, dizziness, SOB, chest pain, numbness or tingling in extremities. Patient reports taking all  medications as prescribed and reports no adverse effects, though she has not taken her medication today. She will take it when she gets home.      Risks, benefits, and alternatives of the medications and treatment plan prescribed today were discussed, and patient expressed understanding. Plan follow up as discussed or as needed if any worsening symptoms or change in condition. Reinforced red flags including (but not limited to): severe or worsening abdominal pain; difficulty swallowing; stiff neck; shortness of breath; coughing or vomiting blood; chest pain; and new or increased fever are indications to go to the Emergency Department.    The patient voices understanding of all medications. No barriers to adherence. Patient is taking all medications as prescribed and tolerating well. For any new medications, the patient was instructed of directions for and consequences of not taking medication and they were informed about the potential side effects and drug interactions. The after-visit summary was given to the patient and care instructions were reviewed with the patient. All questions were answered and the patient verbalized understanding of the plan of care for today.      Orders and Diagnoses  Diagnoses and all orders for this visit:  Acute URI  -     levocetirizine (Xyzal) 5 mg tablet; Take 1 tablet (5 mg) by mouth once daily in the evening.  -     benzonatate (Tessalon) 200 mg capsule; Take 1 capsule (200 mg) by mouth 3 times a day as needed for cough for up to 10 days. Do not crush or chew.  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      Coronary artery disease involving transplanted heart, angina presence unspecified, unspecified vessel or lesion type    Encounter for screening mammogram for malignant neoplasm of breast 2014    Visit for screening mammogram    Other conditions influencing health status     DEXA Body Composition Study    Other long term (current) drug therapy 2019    Chronic prescription benzodiazepine use    Personal history of diseases of the skin and subcutaneous tissue     History of psoriasis    Personal history of other diseases of the circulatory system     History of hypertension    Personal history of other diseases of the nervous system and sense organs     History of cataract    Unspecified secondary cataract     Secondary cataract   [3]   Past Surgical History:  Procedure Laterality Date     SECTION, CLASSIC  2013     Section    COLONOSCOPY  2013    Complete Colonoscopy    OTHER SURGICAL HISTORY  2019    Ganglion cyst excision    OTHER SURGICAL HISTORY  2019    Cataract surgery    OTHER SURGICAL HISTORY  2019    YAG laser capsulotomy    OTHER SURGICAL HISTORY  2016    Dilation Of Female Urethra    US GUIDED FINE PERCUTANEOUS ASPIRATION  2020    US GUIDED FINE PERCUTANEOUS ASPIRATION [facility]

## 2025-05-16 DIAGNOSIS — M85.80 OSTEOPENIA, UNSPECIFIED LOCATION: ICD-10-CM

## 2025-05-19 RX ORDER — ALENDRONATE SODIUM 70 MG/1
70 TABLET ORAL
Qty: 12 TABLET | Refills: 0 | Status: SHIPPED | OUTPATIENT
Start: 2025-05-25

## 2025-06-10 ENCOUNTER — APPOINTMENT (OUTPATIENT)
Dept: PRIMARY CARE | Facility: CLINIC | Age: 85
End: 2025-06-10
Payer: MEDICARE

## 2025-06-10 ENCOUNTER — APPOINTMENT (OUTPATIENT)
Dept: CARDIOLOGY | Facility: CLINIC | Age: 85
End: 2025-06-10
Payer: MEDICARE

## 2025-06-10 VITALS
WEIGHT: 173 LBS | OXYGEN SATURATION: 96 % | HEIGHT: 56 IN | SYSTOLIC BLOOD PRESSURE: 144 MMHG | BODY MASS INDEX: 38.92 KG/M2 | HEART RATE: 66 BPM | DIASTOLIC BLOOD PRESSURE: 75 MMHG

## 2025-06-10 DIAGNOSIS — E78.2 MIXED HYPERLIPIDEMIA: ICD-10-CM

## 2025-06-10 DIAGNOSIS — E03.9 HYPOTHYROIDISM, UNSPECIFIED TYPE: ICD-10-CM

## 2025-06-10 DIAGNOSIS — E66.812 CLASS 2 SEVERE OBESITY DUE TO EXCESS CALORIES WITH SERIOUS COMORBIDITY AND BODY MASS INDEX (BMI) OF 38.0 TO 38.9 IN ADULT: ICD-10-CM

## 2025-06-10 DIAGNOSIS — I25.118 CORONARY ARTERY DISEASE OF NATIVE ARTERY OF NATIVE HEART WITH STABLE ANGINA PECTORIS: ICD-10-CM

## 2025-06-10 DIAGNOSIS — Z00.00 WELLNESS EXAMINATION: ICD-10-CM

## 2025-06-10 DIAGNOSIS — Z00.00 MEDICARE ANNUAL WELLNESS VISIT, SUBSEQUENT: ICD-10-CM

## 2025-06-10 DIAGNOSIS — F41.9 ANXIETY: ICD-10-CM

## 2025-06-10 DIAGNOSIS — E55.9 VITAMIN D DEFICIENCY: ICD-10-CM

## 2025-06-10 DIAGNOSIS — E11.9 TYPE 2 DIABETES MELLITUS WITHOUT COMPLICATION, WITHOUT LONG-TERM CURRENT USE OF INSULIN: Primary | ICD-10-CM

## 2025-06-10 DIAGNOSIS — I10 BENIGN ESSENTIAL HYPERTENSION: ICD-10-CM

## 2025-06-10 DIAGNOSIS — M85.80 OSTEOPENIA, UNSPECIFIED LOCATION: ICD-10-CM

## 2025-06-10 DIAGNOSIS — E66.01 CLASS 2 SEVERE OBESITY DUE TO EXCESS CALORIES WITH SERIOUS COMORBIDITY AND BODY MASS INDEX (BMI) OF 38.0 TO 38.9 IN ADULT: ICD-10-CM

## 2025-06-10 DIAGNOSIS — G25.0 ESSENTIAL TREMOR: ICD-10-CM

## 2025-06-10 DIAGNOSIS — Z12.31 ENCOUNTER FOR SCREENING MAMMOGRAM FOR MALIGNANT NEOPLASM OF BREAST: ICD-10-CM

## 2025-06-10 RX ORDER — LEVOTHYROXINE SODIUM 88 UG/1
88 TABLET ORAL DAILY
Qty: 100 TABLET | Refills: 1 | Status: SHIPPED | OUTPATIENT
Start: 2025-06-10 | End: 2025-12-27

## 2025-06-10 ASSESSMENT — ACTIVITIES OF DAILY LIVING (ADL)
TAKING_MEDICATION: INDEPENDENT
DRESSING: INDEPENDENT
BATHING: INDEPENDENT
MANAGING_FINANCES: INDEPENDENT
GROCERY_SHOPPING: INDEPENDENT
DOING_HOUSEWORK: INDEPENDENT

## 2025-06-10 ASSESSMENT — ENCOUNTER SYMPTOMS
DEPRESSION: 0
OCCASIONAL FEELINGS OF UNSTEADINESS: 1
LOSS OF SENSATION IN FEET: 0

## 2025-06-10 NOTE — PROGRESS NOTES
84-year-old female presenting for follow-up on multiple concerns, Medicare annual wellness exam/CPE.    DMII  Stable and doing well on dietary control only.    HTN  Stable, tolerates current regimen well.  Follows with cardiology.    HLD  Stable, tolerating current regimen well.    Anxiety  Stable, tolerating current regimen well.    Hypothyroidism  Stable, tolerating current regimen well.    Osteopenia  Stable, tolerating current regimen well.  DEXA last year was normal.    Essential tremor  Stable, tolerating current regimen well.    12 point ROS reviewed and negative other than as stated in HPI    General: Alert, oriented, pleasant, in no acute distress  HEENT:      Head: normocephalic, atraumatic;      eyes: EOMI, no scleral icterus;   Neck: soft, supple, non-tender, no masses appreciated  CV: Heart with regular rate and rhythm, normal S1/S2, 4 out of 6 systolic murmur  Lungs: CTAB without wheezing, rhonchi or rales; good respiratory effort, no increased work of breathing  Abdomen: soft, non-tender, non-distended, no masses appreciated  Extremities: Trace-+1 edema, no cyanosis  Neuro: Cranial nerves grossly intact; alert and oriented, generalized tremor present  Psych: Appropriate mood and affect    # HM  -CBC, CMP, Lipid panel, Vit D, TSH with reflex T4  -Vaccines:       Flu: Out of season      Shingrix: UTD      Pneumococcal: UTD      Tdap: UTD   -Hiram w/ konstantin: Ordered  -Colonoscopy: No longer screening  -Osteoporosis screenin2024, normal    #DMII  -Last A1c 5.4, 2024  -UTD with ophthalmology   - Diabetic foot exam negative in office 2025  - Diet controlled  -Repeat A1c, albumin    #HTN #systolic murmur  - Slightly above goal in office  -Most recent echo with mild to moderate mitral valve regurgitation, moderate to severe aortic valve stenosis  - Currently on amlodipine 5 mg daily, losartan-HCTZ 100-25 mg daily, given BP logs to do for 1-2 weeks at home before her upcoming cardiology  appointment  - Repeat CMP  - Continue to follow with cardiology    #HLD #CAD  -Currently on rosuvastatin 40 mg daily, ezetimibe 10 mg daily  - Repeat lipid panel    #Anxiety   -Stable, doing well  - Continue escitalopram 10 mg daily    #hypothyroidism  - Continue levothyroxine 88 mcg daily  - Repeat TSH    #Osteopenia  -most recent bone density within normal limits  -Currently on alendronate 70 mg weekly, we are going to do drug holiday, will follow-up with DEXA next year    #Essential tremor  -Currently on primidone and propranolol  - Following with neurology    #Bilateral lower extremity edema  -Not significant, improves overnight, advised to use compression stockings every morning    F/U 6 months, sooner if needed    Chris D'Amico, DO

## 2025-06-18 LAB
25(OH)D3+25(OH)D2 SERPL-MCNC: 58 NG/ML (ref 30–100)
ALBUMIN SERPL-MCNC: 4.3 G/DL (ref 3.6–5.1)
ALP SERPL-CCNC: 66 U/L (ref 37–153)
ALT SERPL-CCNC: 25 U/L (ref 6–29)
ANION GAP SERPL CALCULATED.4IONS-SCNC: 10 MMOL/L (CALC) (ref 7–17)
AST SERPL-CCNC: 17 U/L (ref 10–35)
BILIRUB SERPL-MCNC: 0.3 MG/DL (ref 0.2–1.2)
BUN SERPL-MCNC: 22 MG/DL (ref 7–25)
CALCIUM SERPL-MCNC: 9.4 MG/DL (ref 8.6–10.4)
CHLORIDE SERPL-SCNC: 103 MMOL/L (ref 98–110)
CHOLEST SERPL-MCNC: 159 MG/DL
CHOLEST/HDLC SERPL: 2.7 (CALC)
CO2 SERPL-SCNC: 29 MMOL/L (ref 20–32)
CREAT SERPL-MCNC: 0.67 MG/DL (ref 0.6–0.95)
EGFRCR SERPLBLD CKD-EPI 2021: 87 ML/MIN/1.73M2
ERYTHROCYTE [DISTWIDTH] IN BLOOD BY AUTOMATED COUNT: 15.6 % (ref 11–15)
EST. AVERAGE GLUCOSE BLD GHB EST-MCNC: 117 MG/DL
EST. AVERAGE GLUCOSE BLD GHB EST-SCNC: 6.5 MMOL/L
GLUCOSE SERPL-MCNC: 108 MG/DL (ref 65–99)
HBA1C MFR BLD: 5.7 %
HCT VFR BLD AUTO: 39.6 % (ref 35–45)
HDLC SERPL-MCNC: 58 MG/DL
HGB BLD-MCNC: 12 G/DL (ref 11.7–15.5)
LDLC SERPL CALC-MCNC: 78 MG/DL (CALC)
MCH RBC QN AUTO: 22.7 PG (ref 27–33)
MCHC RBC AUTO-ENTMCNC: 30.3 G/DL (ref 32–36)
MCV RBC AUTO: 74.9 FL (ref 80–100)
NONHDLC SERPL-MCNC: 101 MG/DL (CALC)
PLATELET # BLD AUTO: 265 THOUSAND/UL (ref 140–400)
PMV BLD REES-ECKER: 10.9 FL (ref 7.5–12.5)
POTASSIUM SERPL-SCNC: 4.4 MMOL/L (ref 3.5–5.3)
PROT SERPL-MCNC: 6.7 G/DL (ref 6.1–8.1)
RBC # BLD AUTO: 5.29 MILLION/UL (ref 3.8–5.1)
SODIUM SERPL-SCNC: 142 MMOL/L (ref 135–146)
TRIGL SERPL-MCNC: 132 MG/DL
TSH SERPL-ACNC: 1.4 MIU/L (ref 0.4–4.5)
WBC # BLD AUTO: 5.9 THOUSAND/UL (ref 3.8–10.8)

## 2025-06-19 DIAGNOSIS — E78.2 MIXED HYPERLIPIDEMIA: Primary | ICD-10-CM

## 2025-06-19 RX ORDER — EZETIMIBE 10 MG/1
10 TABLET ORAL DAILY
Qty: 90 TABLET | Refills: 3 | Status: SHIPPED | OUTPATIENT
Start: 2025-06-19 | End: 2026-06-19

## 2025-07-01 ENCOUNTER — APPOINTMENT (OUTPATIENT)
Dept: CARDIOLOGY | Facility: CLINIC | Age: 85
End: 2025-07-01
Payer: MEDICARE

## 2025-07-01 VITALS
BODY MASS INDEX: 40.63 KG/M2 | OXYGEN SATURATION: 96 % | DIASTOLIC BLOOD PRESSURE: 70 MMHG | SYSTOLIC BLOOD PRESSURE: 162 MMHG | WEIGHT: 180.6 LBS | HEIGHT: 56 IN | HEART RATE: 68 BPM

## 2025-07-01 DIAGNOSIS — I35.0 AORTIC VALVE STENOSIS, ETIOLOGY OF CARDIAC VALVE DISEASE UNSPECIFIED: Primary | ICD-10-CM

## 2025-07-01 PROCEDURE — 1159F MED LIST DOCD IN RCRD: CPT | Performed by: INTERNAL MEDICINE

## 2025-07-01 PROCEDURE — 1036F TOBACCO NON-USER: CPT | Performed by: INTERNAL MEDICINE

## 2025-07-01 PROCEDURE — 99214 OFFICE O/P EST MOD 30 MIN: CPT | Performed by: INTERNAL MEDICINE

## 2025-07-01 PROCEDURE — G2211 COMPLEX E/M VISIT ADD ON: HCPCS | Performed by: INTERNAL MEDICINE

## 2025-07-01 PROCEDURE — 1126F AMNT PAIN NOTED NONE PRSNT: CPT | Performed by: INTERNAL MEDICINE

## 2025-07-01 PROCEDURE — 1160F RVW MEDS BY RX/DR IN RCRD: CPT | Performed by: INTERNAL MEDICINE

## 2025-07-01 PROCEDURE — 3078F DIAST BP <80 MM HG: CPT | Performed by: INTERNAL MEDICINE

## 2025-07-01 PROCEDURE — 3077F SYST BP >= 140 MM HG: CPT | Performed by: INTERNAL MEDICINE

## 2025-07-01 PROCEDURE — 99212 OFFICE O/P EST SF 10 MIN: CPT

## 2025-07-01 ASSESSMENT — PAIN SCALES - GENERAL: PAINLEVEL_OUTOF10: 0-NO PAIN

## 2025-07-01 ASSESSMENT — ENCOUNTER SYMPTOMS
DEPRESSION: 0
OCCASIONAL FEELINGS OF UNSTEADINESS: 1
LOSS OF SENSATION IN FEET: 0

## 2025-07-01 NOTE — PATIENT INSTRUCTIONS
Continue medications  Labs done this year  Call [phone] to schedule your Echo to be done at your next visit  Follow up in 6 months

## 2025-07-01 NOTE — PROGRESS NOTES
Primary Care Physician: Christopher D'Amico, DO  Date of Visit: 2025  2:45 PM EDT  Location of visit: [facility]     Chief Complaint:   No chief complaint on file.    HPI / Summary:   Monica Hernandez is a 84 y.o. 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  Surgical Hx:   She has a past surgical history that includes Colonoscopy (2013);  section, classic (2013); Other surgical history (2019); Other surgical history (2019); Other surgical history (2019); Other surgical history (2016); and US guided fine percutaneous aspiration (2020).   Social Hx:   She reports that she quit smoking about 45 years ago. Her smoking use included cigarettes. She has never been exposed to tobacco smoke. She has never used smokeless tobacco. She reports that she does not drink alcohol and does not use drugs.  Family Hx:   Her family history is not on file.   Allergies:  Allergies   Allergen Reactions    Tetracyclines Unknown and Other    Cortisone Other     Redness of face and anxiety    Sulfamethoxazole-Trimethoprim Rash     Outpatient Medications:  Current Outpatient Medications   Medication Instructions    amLODIPine (NORVASC) 5 mg, oral, Daily    blood-glucose meter misc ONE TOUCH ULTRA GLUCOMETER    calcium carbonate-vitamin D3 (Calcium 600 with Vitamin D3) 600 mg-10 mcg (400 unit) chewable tablet 1 tablet, 2 times daily     cholecalciferol (Vitamin D-3) 125 MCG (5000 UT) capsule 1 capsule, Daily    cyanocobalamin, vitamin B-12, 2,500 mcg tablet, sublingual 1 tablet, sublingual, Daily RT    escitalopram (LEXAPRO) 10 mg, oral, Daily    ezetimibe (ZETIA) 10 mg, oral, Daily    fluticasone (Flonase) 50 mcg/actuation nasal spray 2 sprays, Daily PRN    hydrocortisone 2.5 % cream APPLY TWICE A DAY TO AFFECTED AREAS AS NEEDED    levocetirizine (XYZAL) 5 mg, oral, Every evening    levothyroxine (SYNTHROID, LEVOXYL) 88 mcg, oral, Daily, Take on an empty stomach at the same time each day, either 30 to 60 minutes prior to breakfast    losartan-hydrochlorothiazide (Hyzaar) 100-25 mg tablet 1 tablet, oral, Daily    multivit-iron-minerals-folic acid (Centrum Silver) 0.4 mg-300 mcg- 250 mcg tab 1 tablet, Daily    OneTouch Ultra Test strip Daily RT    predniSONE (Deltasone) 20 mg tablet     primidone (Mysoline) 50 mg tablet TAKE 2 & 1/2 TABLETS IN THE MORNING, 2 & 1/2 TABLETS IN THE AFTERNOON AND 3 TABLETS AT NIGHT    propranolol (INDERAL) 80 mg, oral, 3 times daily    rosuvastatin (CRESTOR) 40 mg, oral, Daily     Physical Exam:  There were no vitals filed for this visit.    Wt Readings from Last 5 Encounters:   06/10/25 78.5 kg (173 lb)   01/14/25 78.5 kg (173 lb)   12/10/24 78.8 kg (173 lb 12.8 oz)   12/10/24 79.4 kg (175 lb)   10/10/24 77.6 kg (171 lb)     Physical Exam  JVP not elevated. Carotid impulses are 2+ without overlying bruit.   Chest exhibits fair to good air movement with completely clear breath sounds.   The cardiac rhythm is regular with no premature beats.   Normal S1 and S2. No gallop, murmur or rub, or click.   Abdomen is soft and benign without focal tenderness.   With no lower leg edema. The pedal pulses are intact.     Last Labs:  Office Visit on 06/10/2025   Component Date Value    WHITE BLOOD CELL COUNT 06/18/2025 5.9     RED BLOOD CELL COUNT 06/18/2025 5.29 (H)     HEMOGLOBIN 06/18/2025 12.0     HEMATOCRIT 06/18/2025 39.6      MCV 2025 74.9 (L)     MCH 2025 22.7 (L)     MCHC 2025 30.3 (L)     RDW 2025 15.6 (H)     PLATELET COUNT 2025 265     MPV 2025 10.9     CHOLESTEROL, TOTAL 2025 159     HDL CHOLESTEROL 2025 58     TRIGLYCERIDES 2025 132     LDL-CHOLESTEROL 2025 78     CHOL/HDLC RATIO 2025 2.7     NON HDL CHOLESTEROL 2025 101     GLUCOSE 2025 108 (H)     UREA NITROGEN (BUN) 2025 22     CREATININE 2025 0.67     EGFR 2025 87     SODIUM 2025 142     POTASSIUM 2025 4.4     CHLORIDE 2025 103     CARBON DIOXIDE 2025 29     ELECTROLYTE BALANCE 2025 10     CALCIUM 2025 9.4     PROTEIN, TOTAL 2025 6.7     ALBUMIN 2025 4.3     BILIRUBIN, TOTAL 2025 0.3     ALKALINE PHOSPHATASE 2025 66     AST 2025 17     ALT 2025 25     TSH W/REFLEX TO FT4 2025 1.40     VITAMIN D,25-OH,TOTAL,IA 2025 58     HEMOGLOBIN A1c 2025 5.7 (H)     eAG (mg/dL) 2025 117     eAG (mmol/L) 2025 6.5    Office Visit on 2025   Component Date Value    POC Group A Strep, PCR 2025 Negative     POC Respiratory Syncytia* 2025 Negative     POC Influenza A Virus PCR 2025 Negative     POC Influenza B Virus PCR 2025 Negative     POC Human Rhinovirus PCR 2025 Positive (A)         Assessment/Plan         1. CAD. This patient is a very pleasant white female whose past history includes hypertension hyperlipidemia and glucose intolerance. She was a very remote smoker. She does have a family history of CAD including a older sister who  of MI at the age of 78 who is a diabetic beginning at the age of 17. She does have a brother however aged 83 who has 3 stents. She also has a sister age 78 rheumatoid arthritis who also has CHF and a minor CVA. The patient herself has not had any ongoing concerning chest discomfort. The patient did have a CT coronary calcium  score performed on 3/24/2011 with a value of 225. Based on her risk factors and CT coronary calcium score the patient subsequently had an echocardiogram performed on 01/23/2015 which showed normal LV chamber size with mild concentric LV hypertrophy and vigorous left ventricular systolic function with an estimated ejection fraction of greater than 65%. There was mild left atrial enlargement, mild aortic valve thickening, mild aortic valve insufficiency. The patient also had a pharmacological nuclear stress test on 01/23/2015 which showed normal myocardial perfusion with no defects and an LV ejection fraction of 55%. The patient is presently asymptomatic and will be monitored clinically. Prelim read of echo done today showed EF 65%, mild LVH, 1+ AI with 31 mm Hg gradient and mild MR, mild aortic sclerosis, mild LAE.  Patient does have a systolic ejection murmur.  Patient had pharmacologic nuclear stress testing done 12/2021 that was negative for ischemia.  The patient is presently feeling fairly well using a walker to ambulate  but less so in the house.  No lightheadedness dizziness palpitations or shortness of breath.  Patient doing fairly well without any new symptoms other than for some knee pain and had tremoring.  2. Glucose intolerance. The patient did ave lab work performed on 03/2021 which included a hemoglobin A1c of 6.8%. The patient presently is not on any oral hypoglycemic therapy.  Lab work 6/18/2025 includes a normal CBC and CMP with a glycohemoglobin of 5.7% vitamin D 58.  3. Hyperlipidemia. The patient just started Crestor 40 mg daily with lipid panel performed on 06/2021 including cholesterol 181 LDL 94 HDL 55 triglyceride 158. Start Zetia.  Most recent lipid panel on rosuvastatin 40 mg daily.  Zetia 10 mg daily.  Will recheck a fasting lipid panel CMP included cholesterol 170 LDL 74 HDL 64 triglyceride 160 glycohemoglobin and UACR before and next visit.  Lab work 7/24/2024 includes satisfactory lipid  panel with cholesterol 146 LDL 64 HDL 58 triglyceride 123.  Patient will remain on combination of rosuvastatin 40 mg daily Zetia 10 mg daily.  Additional lab work included a normal CBC and CMP glycohemoglobin 5.6% vitamin D level 51.  Lipid panel from 2025 reasonable if not perfect with cholesterol 159 LDL 78 HDL 58 triglyceride 132.  4. Hypertension. Blood pressure is well controlled today. For now she will remain on amlodipine 5 mg daily rather than the 10 mg dose due to edema. She also will remain on losartan 100 mg daily, Aldactazide 25/25 mg daily and propanolol 80 mg 3 times a day.  Lab work from 1512/15/2020 3/2022 included a glycohemoglobin of 5.8 normal CBC CMP panel  vitamin D 44. systolic blood pressure slightly elevated today and will change losartan 100 mg daily to losartan -25 mg daily.  Of the note the patient is also on Aldactazide 25-25 mg daily.  Will monitor renal parameters and electrolytes.  Lab work from 2023 included a normal SMA panel with creatinine of 0.76.  Glycohemoglobin was 5.5% UACR not detected CBC normal.  Glycohemoglobin was 5.7% 2024.  Systolic blood pressure somewhat elevated.  For now we will continue present management.  Consider increasing dose of amlodipine in future.  Patient is on losartan -25 mg daily along with the amlodipine 5 mg daily and propranolol taken primarily for tremors.  Systolic blood pressure slightly elevated office visit 2025 at 162/70 with her home machine reading 178/81 mmHg.  For now she will remain on the amlodipine 5 mg daily for now she will remain on the amlodipine 5 mg daily losartan -25 mg daily and the Inderal 80 mg 3 times daily unchanged.  Hesitate to push aggressive blood pressure therapy due to the presence of her aortic valve stenosis.  5. Anxiety  6. Positive family history of CAD  7. Remote smoking  8. Status post  section x5  9. Remote excision of ganglion cyst from the left  wrist.  10. Tremor. The patient does have a very noticeable tremor despite the propranolol 80 mg 3 times a day and primidone 50 three times per day.  11. Minimal carotid vascular disease. The patient did have a carotid ultrasound on 9/8/2011 which showed minimal intimal thickening.   12. Obesity  13. Hx of thyroid nodule.  Lab work 7/24/2024 included TSH of 0.03 thyroxine of 2.98.  Her Synthroid dose was reduced from 100 mcg daily to 88 mcg daily.  Patient did have a fine needle aspiration of a thyroid nodule ultrasound-guided on 10/24/2024.  Lab work from 6/18/2025 includes a TSH of 1.40.  14. History of COVID-19 vaccine #1 and #2.  15.  Aortic valvular stenosis.  This patient did have an echocardiogram on 5/8/2023 estimating the LV ejection fraction at 60-65%.  She does however have moderately severe aortic valve stenosis peak systolic pressure gradient of 55 mmHg with 1+ aortic insufficiency.  Other findings include 1-2+ mitral valve regurgitation and moderate pulmonary hypertension estimated PA systolic pressure 51 mmHg.  Patient will have a repeat echocardiogram performed at time of next visit in 6 months.  Repeat echocardiogram office visit 12/10/2024 demonstrates mild concentric LV hypertrophy preserved LV ejection fraction 60 to 65% moderately severe aortic valve stenosis peak systolic pressure gradient of 65 mmHg.  Patient will need to be monitored carefully and anticipate she will require TAVR within the next 1 to 3 years.  Repeat echocardiogram in 12 months.  Patient denies any chest discomfort or shortness of breath feeling well.  Patient will have a repeat echocardiogram performed at time of next office visit 6 months 1/2026.  Anticipate patient will require TAVR in the next 1 to 3 years.        Orders:  No orders of the defined types were placed in this encounter.     Followup Appts:  Future Appointments   Date Time Provider Department Center   7/1/2025  2:45 PM Paul Edward MD [address]    10/7/2025  3:00 PM TIFFANI ULTRASOUND 3 [facility]   10/7/2025  3:45 PM TIFFANI MAMMO [facility]   10/14/2025  1:00 PM Mikel Marcano MD [id number] [facility]   12/9/2025  1:40 PM Christopher D'Amico, DO [id number] [facility]   1/27/2026  2:30 PM Skyler Austin MD [id number] [facility]           ____________________________________________________________  Paul Edward MD  [facility] [place]  Assistant Clinical Professor, [employer]  [facility]

## 2025-07-14 DIAGNOSIS — F41.9 ANXIETY: ICD-10-CM

## 2025-07-14 DIAGNOSIS — J01.90 ACUTE SINUSITIS, RECURRENCE NOT SPECIFIED, UNSPECIFIED LOCATION: ICD-10-CM

## 2025-07-14 RX ORDER — FLUTICASONE PROPIONATE 50 MCG
2 SPRAY, SUSPENSION (ML) NASAL DAILY PRN
Qty: 16 G | Refills: 2 | Status: SHIPPED | OUTPATIENT
Start: 2025-07-14

## 2025-07-14 RX ORDER — ESCITALOPRAM OXALATE 10 MG/1
10 TABLET ORAL DAILY
Qty: 90 TABLET | Refills: 1 | Status: SHIPPED | OUTPATIENT
Start: 2025-07-14 | End: 2025-07-15 | Stop reason: SDUPTHER

## 2025-07-15 DIAGNOSIS — F41.9 ANXIETY: ICD-10-CM

## 2025-07-15 RX ORDER — ESCITALOPRAM OXALATE 10 MG/1
10 TABLET ORAL DAILY
Qty: 90 TABLET | Refills: 1 | Status: SHIPPED | OUTPATIENT
Start: 2025-07-15

## 2025-07-24 DIAGNOSIS — E78.5 HYPERLIPIDEMIA, UNSPECIFIED HYPERLIPIDEMIA TYPE: Primary | ICD-10-CM

## 2025-07-25 RX ORDER — ROSUVASTATIN CALCIUM 40 MG/1
40 TABLET, COATED ORAL DAILY
Qty: 90 TABLET | Refills: 3 | Status: SHIPPED | OUTPATIENT
Start: 2025-07-25 | End: 2026-07-25

## 2025-08-07 ENCOUNTER — OFFICE VISIT (OUTPATIENT)
Dept: PRIMARY CARE | Facility: CLINIC | Age: 85
End: 2025-08-07
Payer: MEDICARE

## 2025-08-07 ENCOUNTER — HOSPITAL ENCOUNTER (OUTPATIENT)
Dept: RADIOLOGY | Facility: CLINIC | Age: 85
Discharge: HOME | End: 2025-08-07
Payer: MEDICARE

## 2025-08-07 VITALS
BODY MASS INDEX: 40.49 KG/M2 | OXYGEN SATURATION: 96 % | HEIGHT: 56 IN | HEART RATE: 66 BPM | DIASTOLIC BLOOD PRESSURE: 74 MMHG | WEIGHT: 180 LBS | SYSTOLIC BLOOD PRESSURE: 160 MMHG

## 2025-08-07 DIAGNOSIS — M79.601 RIGHT ARM PAIN: ICD-10-CM

## 2025-08-07 DIAGNOSIS — M25.511 ACUTE PAIN OF RIGHT SHOULDER: Primary | ICD-10-CM

## 2025-08-07 DIAGNOSIS — M25.511 ACUTE PAIN OF RIGHT SHOULDER: ICD-10-CM

## 2025-08-07 PROCEDURE — G2211 COMPLEX E/M VISIT ADD ON: HCPCS | Performed by: STUDENT IN AN ORGANIZED HEALTH CARE EDUCATION/TRAINING PROGRAM

## 2025-08-07 PROCEDURE — 99214 OFFICE O/P EST MOD 30 MIN: CPT | Performed by: STUDENT IN AN ORGANIZED HEALTH CARE EDUCATION/TRAINING PROGRAM

## 2025-08-07 PROCEDURE — 1159F MED LIST DOCD IN RCRD: CPT | Performed by: STUDENT IN AN ORGANIZED HEALTH CARE EDUCATION/TRAINING PROGRAM

## 2025-08-07 PROCEDURE — 3077F SYST BP >= 140 MM HG: CPT | Performed by: STUDENT IN AN ORGANIZED HEALTH CARE EDUCATION/TRAINING PROGRAM

## 2025-08-07 PROCEDURE — 3078F DIAST BP <80 MM HG: CPT | Performed by: STUDENT IN AN ORGANIZED HEALTH CARE EDUCATION/TRAINING PROGRAM

## 2025-08-07 PROCEDURE — 73030 X-RAY EXAM OF SHOULDER: CPT | Mod: RT

## 2025-08-07 PROCEDURE — 1160F RVW MEDS BY RX/DR IN RCRD: CPT | Performed by: STUDENT IN AN ORGANIZED HEALTH CARE EDUCATION/TRAINING PROGRAM

## 2025-08-07 PROCEDURE — 73030 X-RAY EXAM OF SHOULDER: CPT | Mod: RIGHT SIDE | Performed by: RADIOLOGY

## 2025-08-07 RX ORDER — PREDNISONE 20 MG/1
40 TABLET ORAL DAILY
Qty: 10 TABLET | Refills: 0 | Status: SHIPPED | OUTPATIENT
Start: 2025-08-07 | End: 2025-08-07

## 2025-08-07 RX ORDER — PREDNISONE 20 MG/1
40 TABLET ORAL DAILY
Qty: 10 TABLET | Refills: 0 | Status: SHIPPED | OUTPATIENT
Start: 2025-08-07 | End: 2025-08-12

## 2025-08-07 ASSESSMENT — COLUMBIA-SUICIDE SEVERITY RATING SCALE - C-SSRS
2. HAVE YOU ACTUALLY HAD ANY THOUGHTS OF KILLING YOURSELF?: NO
6. HAVE YOU EVER DONE ANYTHING, STARTED TO DO ANYTHING, OR PREPARED TO DO ANYTHING TO END YOUR LIFE?: NO
1. IN THE PAST MONTH, HAVE YOU WISHED YOU WERE DEAD OR WISHED YOU COULD GO TO SLEEP AND NOT WAKE UP?: NO

## 2025-08-07 ASSESSMENT — ENCOUNTER SYMPTOMS
LOSS OF SENSATION IN FEET: 0
OCCASIONAL FEELINGS OF UNSTEADINESS: 0
DEPRESSION: 0

## 2025-10-14 ENCOUNTER — APPOINTMENT (OUTPATIENT)
Dept: OTOLARYNGOLOGY | Facility: CLINIC | Age: 85
End: 2025-10-14
Payer: MEDICARE

## 2025-12-09 ENCOUNTER — APPOINTMENT (OUTPATIENT)
Dept: PRIMARY CARE | Facility: CLINIC | Age: 85
End: 2025-12-09
Payer: MEDICARE

## 2026-01-27 ENCOUNTER — APPOINTMENT (OUTPATIENT)
Dept: NEUROLOGY | Facility: CLINIC | Age: 86
End: 2026-01-27
Payer: MEDICARE